# Patient Record
Sex: FEMALE | Race: WHITE | NOT HISPANIC OR LATINO
[De-identification: names, ages, dates, MRNs, and addresses within clinical notes are randomized per-mention and may not be internally consistent; named-entity substitution may affect disease eponyms.]

---

## 2017-01-09 ENCOUNTER — RX RENEWAL (OUTPATIENT)
Age: 72
End: 2017-01-09

## 2017-01-26 ENCOUNTER — APPOINTMENT (OUTPATIENT)
Dept: PHYSICAL MEDICINE AND REHAB | Facility: CLINIC | Age: 72
End: 2017-01-26

## 2017-01-26 VITALS
WEIGHT: 97 LBS | HEART RATE: 86 BPM | BODY MASS INDEX: 20.63 KG/M2 | SYSTOLIC BLOOD PRESSURE: 146 MMHG | TEMPERATURE: 98 F | OXYGEN SATURATION: 98 % | DIASTOLIC BLOOD PRESSURE: 81 MMHG

## 2017-01-26 RX ORDER — ONABOTULINUMTOXINA 100 [USP'U]/1
100 INJECTION, POWDER, LYOPHILIZED, FOR SOLUTION INTRADERMAL; INTRAMUSCULAR
Qty: 6 | Refills: 0 | Status: ACTIVE | COMMUNITY
Start: 2017-01-26 | End: 1900-01-01

## 2017-02-02 ENCOUNTER — OUTPATIENT (OUTPATIENT)
Dept: OUTPATIENT SERVICES | Facility: HOSPITAL | Age: 72
LOS: 1 days | End: 2017-02-02
Payer: MEDICARE

## 2017-02-02 DIAGNOSIS — M25.611 STIFFNESS OF RIGHT SHOULDER, NOT ELSEWHERE CLASSIFIED: ICD-10-CM

## 2017-02-02 DIAGNOSIS — G81.11 SPASTIC HEMIPLEGIA AFFECTING RIGHT DOMINANT SIDE: ICD-10-CM

## 2017-02-02 DIAGNOSIS — R53.1 WEAKNESS: ICD-10-CM

## 2017-02-02 DIAGNOSIS — M25.612 STIFFNESS OF LEFT SHOULDER, NOT ELSEWHERE CLASSIFIED: ICD-10-CM

## 2017-02-02 DIAGNOSIS — M25.512 PAIN IN LEFT SHOULDER: ICD-10-CM

## 2017-02-02 DIAGNOSIS — Z51.89 ENCOUNTER FOR OTHER SPECIFIED AFTERCARE: ICD-10-CM

## 2017-02-02 DIAGNOSIS — I69.351 HEMIPLEGIA AND HEMIPARESIS FOLLOWING CEREBRAL INFARCTION AFFECTING RIGHT DOMINANT SIDE: ICD-10-CM

## 2017-02-02 DIAGNOSIS — M25.511 PAIN IN RIGHT SHOULDER: ICD-10-CM

## 2017-02-02 DIAGNOSIS — I69.890 APRAXIA FOLLOWING OTHER CEREBROVASCULAR DISEASE: ICD-10-CM

## 2017-02-06 ENCOUNTER — RX RENEWAL (OUTPATIENT)
Age: 72
End: 2017-02-06

## 2017-02-06 ENCOUNTER — CHART COPY (OUTPATIENT)
Age: 72
End: 2017-02-06

## 2017-02-06 DIAGNOSIS — G81.11 SPASTIC HEMIPLEGIA AFFECTING RIGHT DOMINANT SIDE: ICD-10-CM

## 2017-02-06 RX ORDER — ONABOTULINUMTOXINA 100 [USP'U]/1
100 INJECTION, POWDER, LYOPHILIZED, FOR SOLUTION INTRADERMAL; INTRAMUSCULAR
Qty: 6 | Refills: 0 | Status: ACTIVE | OUTPATIENT
Start: 2017-02-06

## 2017-02-07 ENCOUNTER — APPOINTMENT (OUTPATIENT)
Dept: PHYSICAL MEDICINE AND REHAB | Facility: CLINIC | Age: 72
End: 2017-02-07

## 2017-02-07 ENCOUNTER — CHART COPY (OUTPATIENT)
Age: 72
End: 2017-02-07

## 2017-02-07 VITALS
OXYGEN SATURATION: 99 % | DIASTOLIC BLOOD PRESSURE: 76 MMHG | HEART RATE: 84 BPM | TEMPERATURE: 97.3 F | SYSTOLIC BLOOD PRESSURE: 119 MMHG

## 2017-02-07 DIAGNOSIS — I69.320 APHASIA FOLLOWING CEREBRAL INFARCTION: ICD-10-CM

## 2017-02-07 DIAGNOSIS — I63.9 CEREBRAL INFARCTION, UNSPECIFIED: ICD-10-CM

## 2017-02-14 ENCOUNTER — APPOINTMENT (OUTPATIENT)
Dept: NEUROLOGY | Facility: CLINIC | Age: 72
End: 2017-02-14

## 2017-02-14 VITALS
DIASTOLIC BLOOD PRESSURE: 60 MMHG | HEIGHT: 57.5 IN | HEART RATE: 72 BPM | SYSTOLIC BLOOD PRESSURE: 100 MMHG | BODY MASS INDEX: 20.64 KG/M2 | WEIGHT: 97 LBS

## 2017-03-16 ENCOUNTER — APPOINTMENT (OUTPATIENT)
Dept: PHYSICAL MEDICINE AND REHAB | Facility: CLINIC | Age: 72
End: 2017-03-16

## 2017-03-16 VITALS
WEIGHT: 99 LBS | DIASTOLIC BLOOD PRESSURE: 84 MMHG | HEIGHT: 57.5 IN | BODY MASS INDEX: 21.07 KG/M2 | SYSTOLIC BLOOD PRESSURE: 152 MMHG | TEMPERATURE: 97.6 F

## 2017-03-16 RX ORDER — ONABOTULINUMTOXINA 100 [USP'U]/1
100 INJECTION, POWDER, LYOPHILIZED, FOR SOLUTION INTRADERMAL; INTRAMUSCULAR
Qty: 6 | Refills: 0 | Status: ACTIVE | OUTPATIENT
Start: 2017-03-16

## 2017-04-07 ENCOUNTER — APPOINTMENT (OUTPATIENT)
Dept: NEUROLOGY | Facility: CLINIC | Age: 72
End: 2017-04-07

## 2017-04-07 VITALS
SYSTOLIC BLOOD PRESSURE: 146 MMHG | HEIGHT: 57.5 IN | DIASTOLIC BLOOD PRESSURE: 82 MMHG | WEIGHT: 99 LBS | BODY MASS INDEX: 21.07 KG/M2 | HEART RATE: 72 BPM

## 2017-04-12 ENCOUNTER — FORM ENCOUNTER (OUTPATIENT)
Age: 72
End: 2017-04-12

## 2017-04-13 ENCOUNTER — APPOINTMENT (OUTPATIENT)
Dept: CT IMAGING | Facility: CLINIC | Age: 72
End: 2017-04-13

## 2017-04-13 ENCOUNTER — LABORATORY RESULT (OUTPATIENT)
Age: 72
End: 2017-04-13

## 2017-04-13 ENCOUNTER — OUTPATIENT (OUTPATIENT)
Dept: OUTPATIENT SERVICES | Facility: HOSPITAL | Age: 72
LOS: 1 days | End: 2017-04-13
Payer: MEDICARE

## 2017-04-13 DIAGNOSIS — Z00.8 ENCOUNTER FOR OTHER GENERAL EXAMINATION: ICD-10-CM

## 2017-04-13 PROCEDURE — 70450 CT HEAD/BRAIN W/O DYE: CPT

## 2017-04-14 LAB
ALBUMIN SERPL ELPH-MCNC: 4.5 G/DL
ALP BLD-CCNC: 84 U/L
ALT SERPL-CCNC: 22 U/L
ANION GAP SERPL CALC-SCNC: 14 MMOL/L
AST SERPL-CCNC: 23 U/L
BASOPHILS # BLD AUTO: 0.02 K/UL
BASOPHILS NFR BLD AUTO: 0.3 %
BILIRUB SERPL-MCNC: 0.5 MG/DL
BUN SERPL-MCNC: 19 MG/DL
CALCIUM SERPL-MCNC: 9.9 MG/DL
CHLORIDE SERPL-SCNC: 99 MMOL/L
CO2 SERPL-SCNC: 28 MMOL/L
CREAT SERPL-MCNC: 0.53 MG/DL
EOSINOPHIL # BLD AUTO: 0.09 K/UL
EOSINOPHIL NFR BLD AUTO: 1.3 %
FOLATE SERPL-MCNC: >20 NG/ML
GLUCOSE SERPL-MCNC: 99 MG/DL
HCT VFR BLD CALC: 40.8 %
HGB BLD-MCNC: 13.8 G/DL
IMM GRANULOCYTES NFR BLD AUTO: 0.1 %
LYMPHOCYTES # BLD AUTO: 1.67 K/UL
LYMPHOCYTES NFR BLD AUTO: 24.5 %
MAN DIFF?: NORMAL
MCHC RBC-ENTMCNC: 32.1 PG
MCHC RBC-ENTMCNC: 33.8 GM/DL
MCV RBC AUTO: 94.9 FL
MONOCYTES # BLD AUTO: 0.33 K/UL
MONOCYTES NFR BLD AUTO: 4.8 %
NEUTROPHILS # BLD AUTO: 4.69 K/UL
NEUTROPHILS NFR BLD AUTO: 69 %
PLATELET # BLD AUTO: 345 K/UL
POTASSIUM SERPL-SCNC: 4.3 MMOL/L
PROT SERPL-MCNC: 7.5 G/DL
RBC # BLD: 4.3 M/UL
RBC # FLD: 12.2 %
SODIUM SERPL-SCNC: 140 MMOL/L
T3 SERPL-MCNC: 100 NG/DL
T3RU NFR SERPL: 1.05 INDEX
T4 SERPL-MCNC: 9 UG/DL
TSH SERPL-ACNC: 1.15 UIU/ML
VIT B12 SERPL-MCNC: 211 PG/ML
WBC # FLD AUTO: 6.81 K/UL

## 2017-05-09 ENCOUNTER — RX RENEWAL (OUTPATIENT)
Age: 72
End: 2017-05-09

## 2017-05-09 RX ORDER — LIDOCAINE HYDROCHLORIDE 30 MG/G
3 CREAM TOPICAL
Qty: 1 | Refills: 2 | Status: ACTIVE | COMMUNITY
Start: 2017-05-09 | End: 1900-01-01

## 2017-05-10 ENCOUNTER — APPOINTMENT (OUTPATIENT)
Dept: PHYSICAL MEDICINE AND REHAB | Facility: CLINIC | Age: 72
End: 2017-05-10

## 2017-05-10 ENCOUNTER — CHART COPY (OUTPATIENT)
Age: 72
End: 2017-05-10

## 2017-05-10 VITALS
TEMPERATURE: 97.7 F | DIASTOLIC BLOOD PRESSURE: 80 MMHG | OXYGEN SATURATION: 96 % | HEART RATE: 80 BPM | SYSTOLIC BLOOD PRESSURE: 125 MMHG

## 2017-05-17 ENCOUNTER — CHART COPY (OUTPATIENT)
Age: 72
End: 2017-05-17

## 2017-05-23 PROCEDURE — G9163: CPT | Mod: CK

## 2017-05-23 PROCEDURE — 92507 TX SP LANG VOICE COMM INDIV: CPT

## 2017-05-23 PROCEDURE — 97110 THERAPEUTIC EXERCISES: CPT

## 2017-05-23 PROCEDURE — 97140 MANUAL THERAPY 1/> REGIONS: CPT

## 2017-05-23 PROCEDURE — 97163 PT EVAL HIGH COMPLEX 45 MIN: CPT

## 2017-05-23 PROCEDURE — 97167 OT EVAL HIGH COMPLEX 60 MIN: CPT

## 2017-05-23 PROCEDURE — 97750 PHYSICAL PERFORMANCE TEST: CPT | Mod: GA

## 2017-05-23 PROCEDURE — G8985: CPT | Mod: CK

## 2017-05-23 PROCEDURE — 97112 NEUROMUSCULAR REEDUCATION: CPT

## 2017-05-23 PROCEDURE — 97535 SELF CARE MNGMENT TRAINING: CPT

## 2017-05-23 PROCEDURE — 97760 ORTHOTIC MGMT&TRAING 1ST ENC: CPT | Mod: GA

## 2017-05-23 PROCEDURE — G9162: CPT | Mod: CK

## 2017-05-23 PROCEDURE — 97116 GAIT TRAINING THERAPY: CPT

## 2017-05-23 PROCEDURE — 92523 SPEECH SOUND LANG COMPREHEN: CPT | Mod: GA

## 2017-05-23 PROCEDURE — G8984: CPT | Mod: CL

## 2017-05-23 PROCEDURE — G8979: CPT | Mod: CK

## 2017-05-23 PROCEDURE — 97530 THERAPEUTIC ACTIVITIES: CPT

## 2017-05-23 PROCEDURE — G8978: CPT | Mod: CL

## 2017-06-03 ENCOUNTER — EMERGENCY (EMERGENCY)
Facility: HOSPITAL | Age: 72
LOS: 0 days | Discharge: HOME | End: 2017-06-03
Attending: EMERGENCY MEDICINE | Admitting: EMERGENCY MEDICINE
Payer: MEDICARE

## 2017-06-03 VITALS
DIASTOLIC BLOOD PRESSURE: 72 MMHG | WEIGHT: 104.94 LBS | RESPIRATION RATE: 16 BRPM | HEIGHT: 62 IN | TEMPERATURE: 98 F | OXYGEN SATURATION: 99 % | SYSTOLIC BLOOD PRESSURE: 108 MMHG | HEART RATE: 86 BPM

## 2017-06-03 VITALS
DIASTOLIC BLOOD PRESSURE: 77 MMHG | OXYGEN SATURATION: 98 % | SYSTOLIC BLOOD PRESSURE: 131 MMHG | HEART RATE: 84 BPM | RESPIRATION RATE: 16 BRPM | TEMPERATURE: 98 F

## 2017-06-03 DIAGNOSIS — Z86.73 PERSONAL HISTORY OF TRANSIENT ISCHEMIC ATTACK (TIA), AND CEREBRAL INFARCTION WITHOUT RESIDUAL DEFICITS: ICD-10-CM

## 2017-06-03 DIAGNOSIS — E78.5 HYPERLIPIDEMIA, UNSPECIFIED: ICD-10-CM

## 2017-06-03 DIAGNOSIS — N39.0 URINARY TRACT INFECTION, SITE NOT SPECIFIED: ICD-10-CM

## 2017-06-03 LAB
ALBUMIN SERPL ELPH-MCNC: 3.8 G/DL — SIGNIFICANT CHANGE UP (ref 3.3–5)
ALP SERPL-CCNC: 100 U/L — SIGNIFICANT CHANGE UP (ref 40–120)
ALT FLD-CCNC: 25 U/L — SIGNIFICANT CHANGE UP (ref 12–78)
ANION GAP SERPL CALC-SCNC: 7 MMOL/L — SIGNIFICANT CHANGE UP (ref 5–17)
APPEARANCE UR: CLEAR — SIGNIFICANT CHANGE UP
AST SERPL-CCNC: 22 U/L — SIGNIFICANT CHANGE UP (ref 15–37)
BACTERIA # UR AUTO: (no result)
BASOPHILS # BLD AUTO: 0.1 K/UL — SIGNIFICANT CHANGE UP (ref 0–0.2)
BASOPHILS NFR BLD AUTO: 1.1 % — SIGNIFICANT CHANGE UP (ref 0–2)
BILIRUB SERPL-MCNC: 0.8 MG/DL — SIGNIFICANT CHANGE UP (ref 0.2–1.2)
BILIRUB UR-MCNC: NEGATIVE — SIGNIFICANT CHANGE UP
BUN SERPL-MCNC: 13 MG/DL — SIGNIFICANT CHANGE UP (ref 7–23)
CALCIUM SERPL-MCNC: 9.5 MG/DL — SIGNIFICANT CHANGE UP (ref 8.5–10.1)
CHLORIDE SERPL-SCNC: 103 MMOL/L — SIGNIFICANT CHANGE UP (ref 96–108)
CK SERPL-CCNC: 66 U/L — SIGNIFICANT CHANGE UP (ref 26–192)
CO2 SERPL-SCNC: 30 MMOL/L — SIGNIFICANT CHANGE UP (ref 22–31)
COLOR SPEC: YELLOW — SIGNIFICANT CHANGE UP
CREAT SERPL-MCNC: 0.57 MG/DL — SIGNIFICANT CHANGE UP (ref 0.5–1.3)
DIFF PNL FLD: (no result)
EOSINOPHIL # BLD AUTO: 0.2 K/UL — SIGNIFICANT CHANGE UP (ref 0–0.5)
EOSINOPHIL NFR BLD AUTO: 2.1 % — SIGNIFICANT CHANGE UP (ref 0–6)
EPI CELLS # UR: SIGNIFICANT CHANGE UP
GLUCOSE SERPL-MCNC: 82 MG/DL — SIGNIFICANT CHANGE UP (ref 70–99)
GLUCOSE UR QL: NEGATIVE MG/DL — SIGNIFICANT CHANGE UP
HCT VFR BLD CALC: 40.2 % — SIGNIFICANT CHANGE UP (ref 34.5–45)
HGB BLD-MCNC: 14 G/DL — SIGNIFICANT CHANGE UP (ref 11.5–15.5)
KETONES UR-MCNC: (no result)
LEUKOCYTE ESTERASE UR-ACNC: (no result)
LYMPHOCYTES # BLD AUTO: 1.8 K/UL — SIGNIFICANT CHANGE UP (ref 1–3.3)
LYMPHOCYTES # BLD AUTO: 19.9 % — SIGNIFICANT CHANGE UP (ref 13–44)
MCHC RBC-ENTMCNC: 32.9 PG — SIGNIFICANT CHANGE UP (ref 27–34)
MCHC RBC-ENTMCNC: 34.8 GM/DL — SIGNIFICANT CHANGE UP (ref 32–36)
MCV RBC AUTO: 94.8 FL — SIGNIFICANT CHANGE UP (ref 80–100)
MONOCYTES # BLD AUTO: 0.6 K/UL — SIGNIFICANT CHANGE UP (ref 0–0.9)
MONOCYTES NFR BLD AUTO: 7 % — SIGNIFICANT CHANGE UP (ref 2–14)
NEUTROPHILS # BLD AUTO: 6.2 K/UL — SIGNIFICANT CHANGE UP (ref 1.8–7.4)
NEUTROPHILS NFR BLD AUTO: 69.8 % — SIGNIFICANT CHANGE UP (ref 43–77)
NITRITE UR-MCNC: NEGATIVE — SIGNIFICANT CHANGE UP
PH UR: 6 — SIGNIFICANT CHANGE UP (ref 5–8)
PLATELET # BLD AUTO: 306 K/UL — SIGNIFICANT CHANGE UP (ref 150–400)
POTASSIUM SERPL-MCNC: 4.1 MMOL/L — SIGNIFICANT CHANGE UP (ref 3.5–5.3)
POTASSIUM SERPL-SCNC: 4.1 MMOL/L — SIGNIFICANT CHANGE UP (ref 3.5–5.3)
PROT SERPL-MCNC: 7 GM/DL — SIGNIFICANT CHANGE UP (ref 6–8.3)
PROT UR-MCNC: NEGATIVE MG/DL — SIGNIFICANT CHANGE UP
RBC # BLD: 4.25 M/UL — SIGNIFICANT CHANGE UP (ref 3.8–5.2)
RBC # FLD: 11 % — SIGNIFICANT CHANGE UP (ref 10.3–14.5)
RBC CASTS # UR COMP ASSIST: (no result) /HPF (ref 0–4)
SODIUM SERPL-SCNC: 140 MMOL/L — SIGNIFICANT CHANGE UP (ref 135–145)
SP GR SPEC: 1.01 — SIGNIFICANT CHANGE UP (ref 1.01–1.02)
TROPONIN I SERPL-MCNC: <0.015 NG/ML — SIGNIFICANT CHANGE UP (ref 0.01–0.04)
UROBILINOGEN FLD QL: NEGATIVE MG/DL — SIGNIFICANT CHANGE UP
WBC # BLD: 8.8 K/UL — SIGNIFICANT CHANGE UP (ref 3.8–10.5)
WBC # FLD AUTO: 8.8 K/UL — SIGNIFICANT CHANGE UP (ref 3.8–10.5)
WBC UR QL: (no result)

## 2017-06-03 PROCEDURE — 93010 ELECTROCARDIOGRAM REPORT: CPT

## 2017-06-03 PROCEDURE — 99284 EMERGENCY DEPT VISIT MOD MDM: CPT

## 2017-06-03 PROCEDURE — 70450 CT HEAD/BRAIN W/O DYE: CPT | Mod: 26

## 2017-06-03 RX ORDER — SODIUM CHLORIDE 9 MG/ML
1000 INJECTION INTRAMUSCULAR; INTRAVENOUS; SUBCUTANEOUS ONCE
Qty: 0 | Refills: 0 | Status: COMPLETED | OUTPATIENT
Start: 2017-06-03 | End: 2017-06-03

## 2017-06-03 RX ORDER — CEFTRIAXONE 500 MG/1
1 INJECTION, POWDER, FOR SOLUTION INTRAMUSCULAR; INTRAVENOUS ONCE
Qty: 0 | Refills: 0 | Status: COMPLETED | OUTPATIENT
Start: 2017-06-03 | End: 2017-06-03

## 2017-06-03 RX ORDER — CEFDINIR 250 MG/5ML
1 POWDER, FOR SUSPENSION ORAL
Qty: 10 | Refills: 0 | OUTPATIENT
Start: 2017-06-03 | End: 2017-06-08

## 2017-06-03 RX ADMIN — CEFTRIAXONE 100 GRAM(S): 500 INJECTION, POWDER, FOR SOLUTION INTRAMUSCULAR; INTRAVENOUS at 17:13

## 2017-06-03 RX ADMIN — SODIUM CHLORIDE 1000 MILLILITER(S): 9 INJECTION INTRAMUSCULAR; INTRAVENOUS; SUBCUTANEOUS at 17:13

## 2017-06-03 NOTE — ED PROVIDER NOTE - OBJECTIVE STATEMENT
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 Denies any pain, HA, abd pain, dysuria. Pt started having seizures last year, was hospitalized for 14 days. As per , when he helped her go to the bathroom, he noticed Pt was dehydration, slurred speech, difficulty walking and left sided weakness. Pt ambulates with cane or walker. Dr. Rao, PMPETE. 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 Denies any pain, HA, abd pain, dysuria. Pt started having seizures last year, was hospitalized for 14 days. As per , when he helped her go to the bathroom, he noticed Pt was dehydration, slurred speech, difficulty walking and left sided weakness. Pt ambulates with cane or walker. Dr. Rao, PMD. Dr. Zheng, neuro. 71 y/o female with PMHx of hemorrhagic CVA with right sided hemiplegia two years ago and seizures presents to the ED c/o subtle weakness x 2 weeks. Denies any pain, HA, abd pain, dysuria. Pt started having seizures last year, was hospitalized for 14 days. As per , when he helped her go to the bathroom, he noticed Pt was dehydration, slurred speech, difficulty walking and left sided weakness. Pt ambulates with cane or walker. Dr. Garza, PMD. Dr. Zheng, neuro.

## 2017-06-03 NOTE — ED ADULT NURSE REASSESSMENT NOTE - NS ED NURSE REASSESS COMMENT FT1
Report taken at the change of shift from Rochelle Carpenter at bedside. Pt awake alert and oriented x4 resting comfortably in bed with no acute distress noted. Family and aid at bedside. Vitals stable. NS infusing at this time. Denies cp,sob,ha,dz,n/v/d/fever/chill or urinary sx. Will cont to monitor.

## 2017-06-03 NOTE — ED PROVIDER NOTE - DETAILS:
I, Sowmya Floyd, performed the initial face to face bedside interview with this patient regarding history of present illness, review of symptoms and relevant past medical, social and family history.  I completed an independent physical examination.  I was the initial provider who evaluated this patient. The history, relevant review of systems, past medical and surgical history, medical decision making, and physical examination was documented by the scribe in my presence and I attest to the accuracy of the documentation.

## 2017-06-03 NOTE — ED PROVIDER NOTE - PMH
Basal ganglia hemorrhage    CVA (cerebral infarction)    High cholesterol    Hyperlipemia    Seizure

## 2017-06-03 NOTE — ED ADULT NURSE REASSESSMENT NOTE - COMFORT CARE
3pm rounding completed, vitals done as well, no other assistance needed
meal provided/patient fed dinner, no other assistance needed
7pm rounding complete, vitals done no other assistance needed
wait time explained

## 2017-06-03 NOTE — ED PROVIDER NOTE - NS ED MD SCRIBE ATTENDING SCRIBE SECTIONS
PROGRESS NOTE/PAST MEDICAL/SURGICAL/SOCIAL HISTORY/VITAL SIGNS( Pullset)/RESULTS/HISTORY OF PRESENT ILLNESS/REVIEW OF SYSTEMS/PHYSICAL EXAM/DISPOSITION

## 2017-06-03 NOTE — ED ADULT NURSE NOTE - CHPI ED SYMPTOMS NEG
no fever/no abdominal distension/no dysuria/no vomiting/no diarrhea/no blood in stool/no hematuria/no chills/no nausea/no burning urination

## 2017-06-03 NOTE — ED ADULT NURSE NOTE - OBJECTIVE STATEMENT
Pt is a 72y female, A & O x 4, VSS, presents to ED w/ urinary hesitency, dehydration, pt sent in by MD Garza for eval., pt hx of CVA in 2014, right sided upper extremity, lower extremity weakness, expressive aphasia, pt is non-ambulatory, pt denies chest pain, SOB, fever, chills, nausea, vomiting or diarrhea, pt aide and family at bedside, pt in no apparent distress, will continue to monitor.

## 2017-06-06 ENCOUNTER — INPATIENT (INPATIENT)
Facility: HOSPITAL | Age: 72
LOS: 2 days | Discharge: NURSING, REHAB OR LONG-TERM CARE | End: 2017-06-09
Attending: INTERNAL MEDICINE | Admitting: HOSPITALIST
Payer: MEDICARE

## 2017-06-06 VITALS
OXYGEN SATURATION: 98 % | TEMPERATURE: 98 F | RESPIRATION RATE: 17 BRPM | HEART RATE: 83 BPM | DIASTOLIC BLOOD PRESSURE: 71 MMHG | SYSTOLIC BLOOD PRESSURE: 135 MMHG | HEIGHT: 58 IN | WEIGHT: 98.11 LBS

## 2017-06-06 LAB
ALBUMIN SERPL ELPH-MCNC: 3.6 G/DL — SIGNIFICANT CHANGE UP (ref 3.3–5)
ALP SERPL-CCNC: 91 U/L — SIGNIFICANT CHANGE UP (ref 40–120)
ALT FLD-CCNC: 25 U/L — SIGNIFICANT CHANGE UP (ref 12–78)
ANION GAP SERPL CALC-SCNC: 6 MMOL/L — SIGNIFICANT CHANGE UP (ref 5–17)
APPEARANCE UR: CLEAR — SIGNIFICANT CHANGE UP
APTT BLD: 28.6 SEC — SIGNIFICANT CHANGE UP (ref 27.5–37.4)
AST SERPL-CCNC: 17 U/L — SIGNIFICANT CHANGE UP (ref 15–37)
BACTERIA # UR AUTO: (no result)
BASOPHILS # BLD AUTO: 0.1 K/UL — SIGNIFICANT CHANGE UP (ref 0–0.2)
BASOPHILS NFR BLD AUTO: 1.4 % — SIGNIFICANT CHANGE UP (ref 0–2)
BILIRUB SERPL-MCNC: 0.5 MG/DL — SIGNIFICANT CHANGE UP (ref 0.2–1.2)
BILIRUB UR-MCNC: NEGATIVE — SIGNIFICANT CHANGE UP
BUN SERPL-MCNC: 13 MG/DL — SIGNIFICANT CHANGE UP (ref 7–23)
CALCIUM SERPL-MCNC: 9.1 MG/DL — SIGNIFICANT CHANGE UP (ref 8.5–10.1)
CHLORIDE SERPL-SCNC: 105 MMOL/L — SIGNIFICANT CHANGE UP (ref 96–108)
CO2 SERPL-SCNC: 32 MMOL/L — HIGH (ref 22–31)
COLOR SPEC: YELLOW — SIGNIFICANT CHANGE UP
CREAT SERPL-MCNC: 0.56 MG/DL — SIGNIFICANT CHANGE UP (ref 0.5–1.3)
DIFF PNL FLD: NEGATIVE — SIGNIFICANT CHANGE UP
EOSINOPHIL # BLD AUTO: 0.1 K/UL — SIGNIFICANT CHANGE UP (ref 0–0.5)
EOSINOPHIL NFR BLD AUTO: 1.6 % — SIGNIFICANT CHANGE UP (ref 0–6)
GLUCOSE SERPL-MCNC: 137 MG/DL — HIGH (ref 70–99)
GLUCOSE UR QL: NEGATIVE MG/DL — SIGNIFICANT CHANGE UP
HCT VFR BLD CALC: 38.8 % — SIGNIFICANT CHANGE UP (ref 34.5–45)
HGB BLD-MCNC: 13.8 G/DL — SIGNIFICANT CHANGE UP (ref 11.5–15.5)
INR BLD: 1.15 RATIO — SIGNIFICANT CHANGE UP (ref 0.88–1.16)
KETONES UR-MCNC: (no result)
LEUKOCYTE ESTERASE UR-ACNC: (no result)
LYMPHOCYTES # BLD AUTO: 1.8 K/UL — SIGNIFICANT CHANGE UP (ref 1–3.3)
LYMPHOCYTES # BLD AUTO: 21.5 % — SIGNIFICANT CHANGE UP (ref 13–44)
MANUAL DIF COMMENT BLD-IMP: SIGNIFICANT CHANGE UP
MCHC RBC-ENTMCNC: 32.7 PG — SIGNIFICANT CHANGE UP (ref 27–34)
MCHC RBC-ENTMCNC: 35.5 GM/DL — SIGNIFICANT CHANGE UP (ref 32–36)
MCV RBC AUTO: 92.1 FL — SIGNIFICANT CHANGE UP (ref 80–100)
MONOCYTES # BLD AUTO: 0.4 K/UL — SIGNIFICANT CHANGE UP (ref 0–0.9)
MONOCYTES NFR BLD AUTO: 4.3 % — SIGNIFICANT CHANGE UP (ref 2–14)
NEUTROPHILS # BLD AUTO: 5.9 K/UL — SIGNIFICANT CHANGE UP (ref 1.8–7.4)
NEUTROPHILS NFR BLD AUTO: 71.1 % — SIGNIFICANT CHANGE UP (ref 43–77)
NITRITE UR-MCNC: NEGATIVE — SIGNIFICANT CHANGE UP
PH UR: 5 — SIGNIFICANT CHANGE UP (ref 5–8)
PLAT MORPH BLD: NORMAL — SIGNIFICANT CHANGE UP
PLATELET # BLD AUTO: 295 K/UL — SIGNIFICANT CHANGE UP (ref 150–400)
POTASSIUM SERPL-MCNC: 3.6 MMOL/L — SIGNIFICANT CHANGE UP (ref 3.5–5.3)
POTASSIUM SERPL-SCNC: 3.6 MMOL/L — SIGNIFICANT CHANGE UP (ref 3.5–5.3)
PROT SERPL-MCNC: 6.6 GM/DL — SIGNIFICANT CHANGE UP (ref 6–8.3)
PROT UR-MCNC: NEGATIVE MG/DL — SIGNIFICANT CHANGE UP
PROTHROM AB SERPL-ACNC: 12.5 SEC — SIGNIFICANT CHANGE UP (ref 9.8–12.7)
RBC # BLD: 4.21 M/UL — SIGNIFICANT CHANGE UP (ref 3.8–5.2)
RBC # FLD: 10.8 % — SIGNIFICANT CHANGE UP (ref 10.3–14.5)
RBC BLD AUTO: NORMAL — SIGNIFICANT CHANGE UP
RBC CASTS # UR COMP ASSIST: SIGNIFICANT CHANGE UP /HPF (ref 0–4)
SODIUM SERPL-SCNC: 143 MMOL/L — SIGNIFICANT CHANGE UP (ref 135–145)
SP GR SPEC: 1.02 — SIGNIFICANT CHANGE UP (ref 1.01–1.02)
UROBILINOGEN FLD QL: NEGATIVE MG/DL — SIGNIFICANT CHANGE UP
WBC # BLD: 8.4 K/UL — SIGNIFICANT CHANGE UP (ref 3.8–10.5)
WBC # FLD AUTO: 8.4 K/UL — SIGNIFICANT CHANGE UP (ref 3.8–10.5)
WBC UR QL: SIGNIFICANT CHANGE UP

## 2017-06-06 PROCEDURE — 93010 ELECTROCARDIOGRAM REPORT: CPT

## 2017-06-06 PROCEDURE — 99285 EMERGENCY DEPT VISIT HI MDM: CPT

## 2017-06-06 PROCEDURE — 71010: CPT | Mod: 26

## 2017-06-06 RX ORDER — DULOXETINE HYDROCHLORIDE 30 MG/1
60 CAPSULE, DELAYED RELEASE ORAL DAILY
Qty: 0 | Refills: 0 | Status: DISCONTINUED | OUTPATIENT
Start: 2017-06-06 | End: 2017-06-09

## 2017-06-06 RX ORDER — DULOXETINE HYDROCHLORIDE 30 MG/1
30 CAPSULE, DELAYED RELEASE ORAL DAILY
Qty: 0 | Refills: 0 | Status: DISCONTINUED | OUTPATIENT
Start: 2017-06-06 | End: 2017-06-09

## 2017-06-06 RX ORDER — PREGABALIN 225 MG/1
1000 CAPSULE ORAL DAILY
Qty: 0 | Refills: 0 | Status: DISCONTINUED | OUTPATIENT
Start: 2017-06-06 | End: 2017-06-09

## 2017-06-06 RX ORDER — LISINOPRIL 2.5 MG/1
10 TABLET ORAL DAILY
Qty: 0 | Refills: 0 | Status: DISCONTINUED | OUTPATIENT
Start: 2017-06-06 | End: 2017-06-09

## 2017-06-06 RX ORDER — OXYBUTYNIN CHLORIDE 5 MG
5 TABLET ORAL
Qty: 0 | Refills: 0 | Status: DISCONTINUED | OUTPATIENT
Start: 2017-06-06 | End: 2017-06-09

## 2017-06-06 RX ORDER — SODIUM CHLORIDE 9 MG/ML
1000 INJECTION INTRAMUSCULAR; INTRAVENOUS; SUBCUTANEOUS ONCE
Qty: 0 | Refills: 0 | Status: COMPLETED | OUTPATIENT
Start: 2017-06-06 | End: 2017-06-06

## 2017-06-06 RX ORDER — ARIPIPRAZOLE 15 MG/1
2 TABLET ORAL DAILY
Qty: 0 | Refills: 0 | Status: DISCONTINUED | OUTPATIENT
Start: 2017-06-06 | End: 2017-06-09

## 2017-06-06 RX ORDER — HEPARIN SODIUM 5000 [USP'U]/ML
5000 INJECTION INTRAVENOUS; SUBCUTANEOUS EVERY 8 HOURS
Qty: 0 | Refills: 0 | Status: DISCONTINUED | OUTPATIENT
Start: 2017-06-06 | End: 2017-06-09

## 2017-06-06 RX ORDER — LEVETIRACETAM 250 MG/1
500 TABLET, FILM COATED ORAL
Qty: 0 | Refills: 0 | Status: DISCONTINUED | OUTPATIENT
Start: 2017-06-06 | End: 2017-06-09

## 2017-06-06 RX ORDER — ATORVASTATIN CALCIUM 80 MG/1
20 TABLET, FILM COATED ORAL AT BEDTIME
Qty: 0 | Refills: 0 | Status: DISCONTINUED | OUTPATIENT
Start: 2017-06-06 | End: 2017-06-09

## 2017-06-06 RX ORDER — LAMOTRIGINE 25 MG/1
100 TABLET, ORALLY DISINTEGRATING ORAL AT BEDTIME
Qty: 0 | Refills: 0 | Status: DISCONTINUED | OUTPATIENT
Start: 2017-06-06 | End: 2017-06-09

## 2017-06-06 RX ADMIN — LEVETIRACETAM 500 MILLIGRAM(S): 250 TABLET, FILM COATED ORAL at 22:26

## 2017-06-06 RX ADMIN — SODIUM CHLORIDE 1000 MILLILITER(S): 9 INJECTION INTRAMUSCULAR; INTRAVENOUS; SUBCUTANEOUS at 18:54

## 2017-06-06 RX ADMIN — DULOXETINE HYDROCHLORIDE 60 MILLIGRAM(S): 30 CAPSULE, DELAYED RELEASE ORAL at 22:26

## 2017-06-06 RX ADMIN — ATORVASTATIN CALCIUM 20 MILLIGRAM(S): 80 TABLET, FILM COATED ORAL at 22:52

## 2017-06-06 RX ADMIN — LAMOTRIGINE 100 MILLIGRAM(S): 25 TABLET, ORALLY DISINTEGRATING ORAL at 22:26

## 2017-06-06 NOTE — ED ADULT NURSE NOTE - OBJECTIVE STATEMENT
Pt family states pt was here a few days ago for UTI, sent home on ABT but brought back today for weakness. Pt alert and forgetful. Color good. Inc of urine x1. Afebrile

## 2017-06-06 NOTE — H&P ADULT - NSHPLABSRESULTS_GEN_ALL_CORE
LABS: All Labs Reviewed:                        13.8   8.4   )-----------( 295      ( 06 Jun 2017 18:16 )             38.8     06-06    143  |  105  |  13  ----------------------------<  137<H>  3.6   |  32<H>  |  0.56    Ca    9.1      06 Jun 2017 18:16    TPro  6.6  /  Alb  3.6  /  TBili  0.5  /  DBili  x   /  AST  17  /  ALT  25  /  AlkPhos  91  06-06    PT/INR - ( 06 Jun 2017 18:16 )   PT: 12.5 sec;   INR: 1.15 ratio         PTT - ( 06 Jun 2017 18:16 )  PTT:28.6 sec          Blood Culture:         [facility] 6/3:    IMPRESSION:     No acute intracranial hemorrhage, mass effect, or midline shift.

## 2017-06-06 NOTE — ED PROVIDER NOTE - OBJECTIVE STATEMENT
73 y/o female with PMHx of TIA 2 years ago with residual right-sided weakness and recent UTI presents to the ED c/o abnormal gait, weakness, dehydration. As per family at bedside, Pt can not stand up or sit up in bed without assistance. Pt uses a walker at home. Pt does not believe this is another stroke. Pt was here 3 days ago for weakness. Pt has speech impediment. Dr. Garza, PMD.

## 2017-06-06 NOTE — ED STATDOCS - PROGRESS NOTE DETAILS
71 y/o female with PMHx of CVA (9/2014), HTN, HLD, seizure disorder (on Keppra), basal ganglia hemorrhage who was tx at  ED for a UTI and dehydration 3 days ago presents to the ED from PMD office c/o weakness x 3 days. Pt is unable to stand, feels weak in the legs starting Saturday night. Pt is recovering from a CVA that occurred 2 years ago and normally ambulates with walker/cane. Denies N/V/D. Currently on Ceftin and has been taking them. +reduced PO intake. Pt fell this morning. Aide was lifting her to use the commode and pts legs gave out. Compliant with Keppra, no recent seizures. PMD Dr. Michelle Heath Will send pt to the main ED for further evaluation.

## 2017-06-06 NOTE — ED STATDOCS - NS ED MD SCRIBE ATTENDING SCRIBE SECTIONS
DISPOSITION/RESULTS/VITAL SIGNS( Pullset)/PROGRESS NOTE/PAST MEDICAL/SURGICAL/SOCIAL HISTORY/PHYSICAL EXAM/HISTORY OF PRESENT ILLNESS/REVIEW OF SYSTEMS

## 2017-06-06 NOTE — ED PROVIDER NOTE - NS ED MD SCRIBE ATTENDING SCRIBE SECTIONS
PHYSICAL EXAM/RESULTS/PAST MEDICAL/SURGICAL/SOCIAL HISTORY/PROGRESS NOTE/DISPOSITION/HISTORY OF PRESENT ILLNESS/VITAL SIGNS( Pullset)/REVIEW OF SYSTEMS

## 2017-06-06 NOTE — H&P ADULT - NSHPPHYSICALEXAM_GEN_ALL_CORE
PHYSICAL EXAM:    Constitutional: NAD, awake and alert, well-developed  HEENT: PERR, EOMI, Normal Hearing, MMM  Neck: Soft and supple, No LAD, No JVD  Respiratory: Breath sounds are clear bilaterally, No wheezing, rales or rhonchi  Cardiovascular: S1 and S2, regular rate and rhythm, no Murmurs, gallops or rubs  Gastrointestinal: Bowel Sounds present, soft, nontender, nondistended, no guarding, no rebound  Extremities: No peripheral edema  Vascular: 2+ peripheral pulses  Neurological: A/O x 3, no focal deficits  Musculoskeletal: 5/5 strength b/l upper and lower extremities  Skin: No rashes PHYSICAL EXAM:    Constitutional: NAD, awake and alert, chronic dysarthria  HEENT: PERR, EOMI, Normal Hearing, MMM  Respiratory: Breath sounds are clear bilaterally, No wheezing, rales or rhonchi  Cardiovascular: S1S2. RRR  Gastrointestinal: nl bowel sounds, no R/G  Extremities: No peripheral edema  Neurological: A/O x 3,   Musculoskeletal: 2/5 strength in RUE 4/5 in both lower. 4/5 in LUE. CN: 2-12 intact.   Skin: No rashes PHYSICAL EXAM:    Constitutional: NAD, awake and alert, chronic dysarthria  HEENT: PERR, EOMI, Normal Hearing, MMM  Respiratory: Breath sounds are clear bilaterally, No wheezing, rales or rhonchi  Cardiovascular: S1S2. RRR  Gastrointestinal: nl bowel sounds, no R/G  Extremities: No peripheral edema  Neurological: A/O x 3,   Musculoskeletal: 2/5 strength in RUE which withdraws to pain; 4/5 motor strength in both lower extremities. 4/5 in LUE. CN: 2-12 intact.   Skin: No rashes

## 2017-06-06 NOTE — ED STATDOCS - DETAILS:
I, Kevin Greer DO, performed the initial face to face bedside interview with this patient regarding history of present illness and determined that the patient should be seen in the main ED.  The history, was documented by the scribe in my presence and I attest to the accuracy of the documentation.

## 2017-06-06 NOTE — ED ADULT NURSE REASSESSMENT NOTE - NS ED NURSE REASSESS COMMENT FT1
patient updated on plan of care. VSS. denies any pain. patient admitted to [facility]. currently waiting for transport to unit .

## 2017-06-06 NOTE — ED ADULT NURSE NOTE - CHPI ED SYMPTOMS NEG
no decreased eating/drinking/no nausea/no fever/no numbness/no tingling/no vomiting/no chills/no dizziness

## 2017-06-06 NOTE — H&P ADULT - HISTORY OF PRESENT ILLNESS
73 y/o female with PMHx of TIA 2 years ago with residual right-sided weakness and recent UTI presents to the ED c/o abnormal gait, weakness, dehydration. As per family at bedside, Pt can not stand up or sit up in bed without assistance. Pt uses a walker at home. Pt does not believe this is another stroke. Pt was here 3 days ago for weakness. Pt has speech impediment. Dr. Garza, PMD. 71 y/o female with PMHx of CVA 2 years ago with residual right-sided weakness, HTN, dyslipidemia, seizure d/o and recent UTI presents to the ED c/o abnormal gait, weakness, dehydration. As per family at bedside, Pt can not stand up or sit up in bed without assistance. Pt uses a walker at home. Pt was here 3 days ago for weakness. Was d/c from ED for uti.  Pt has speech impediment. Dr. Garza, PMD. 71 y/o female with PMHx of CVA 2 years ago with residual right-sided weakness, basal ganglia hemorrhage, HTN, dyslipidemia, seizure d/o and recent UTI presents to the ED c/o abnormal gait, weakness, dehydration. As per her partner Jasen, patient has had a significant functional decline over several weeks  Pt can not stand up or sit up in bed without assistance. Pt uses a walker at home. Pt was here 3 days ago for weakness. Was d/c from ED for uti.  Pt has known speech impediment. Dr. Garza - PCP    Denies N/V/D. No dysuria. No urinary frequency.       Social:former smoker, no etoh  Shx: none  Fhx: NC 73 y/o female with PMHx of CVA 2 years ago with residual right-sided weakness, basal ganglia hemorrhage in 2014 secondarily to uncontrolled HTN, dyslipidemia, seizure d/o and recent UTI presents to the ED c/o abnormal gait, weakness, dehydration. As per her partner Jasen, patient has had a significant functional decline over several weeks  Pt can not stand up or sit up in bed without assistance. Pt uses a walker at home. Pt was here 3 days ago for weakness. Was d/c from ED for uti.  Pt has known speech impediment. Dr. Garza - PCP    Denies N/V/D. No dysuria. No urinary frequency.       Social:former smoker, no etoh  Shx: none  Fhx: NC

## 2017-06-06 NOTE — H&P ADULT - NSHPREVIEWOFSYSTEMS_GEN_ALL_CORE
REVIEW OF SYSTEMS:    CONSTITUTIONAL: No weakness, fevers or chills  EYES/ENT: No visual changes;  No vertigo or throat pain   NECK: No pain or stiffness  RESPIRATORY: No cough, wheezing, hemoptysis; No shortness of breath  CARDIOVASCULAR: No chest pain or palpitations  GASTROINTESTINAL: No abdominal or epigastric pain. No nausea, vomiting, or hematemesis; No diarrhea or constipation. No melena or hematochezia.  GENITOURINARY: No dysuria, frequency or hematuria  NEUROLOGICAL: No numbness or weakness  SKIN: No itching, burning, rashes, or lesions   All other review of systems is negative unless indicated above REVIEW OF SYSTEMS:    CONSTITUTIONAL: functional weakness over several weakness  EYES/ENT: No visual changes;  No vertigo or throat pain   NECK: No pain or stiffness  RESPIRATORY: No cough, wheezing, hemoptysis; No shortness of breath  CARDIOVASCULAR: No chest pain or palpitations  GASTROINTESTINAL: No abdominal or epigastric pain. No nausea, vomiting, or hematemesis; No diarrhea or constipation. No melena or hematochezia.  GENITOURINARY: No dysuria, frequency or hematuria  NEUROLOGICAL: No numbness or weakness  SKIN: No itching, burning, rashes, or lesions   All other review of systems is negative unless indicated above

## 2017-06-06 NOTE — H&P ADULT - ASSESSMENT
73 y/o female with PMHx of CVA 2 years ago with residual right-sided weakness, HTN, dyslipidemia, seizure d/o and recent UTI presents to the ED c/o abnormal gait, weakness, dehydration. As per family at bedside, Pt can not stand up or sit up in bed without assistance. Pt uses a walker at home. Pt was here 3 days ago for weakness. Was d/c from ED for uti.  Pt has speech impediment. Dr. Garza, PMD. 71 y/o female with PMHx of CVA 2 years ago with residual right-sided weakness, HTN, dyslipidemia, seizure d/o and recent UTI presents to the ED c/o abnormal gait, weakness, dehydration. As per family at bedside, Pt can not stand up or sit up in bed without assistance. Pt uses a walker at home. Pt was here 3 days ago for weakness. Was d/c from ED for uti.  Pt has speech impediment. Dr. Garza      #weakness and functional decline secondary to dehydration  Admit for IVF  R/o infection etiology   hx of CVA with functional decline  PT consult      #HTN  Cont lisinopril    #Hyperlipidemia  Statin      Dvt px

## 2017-06-07 RX ADMIN — HEPARIN SODIUM 5000 UNIT(S): 5000 INJECTION INTRAVENOUS; SUBCUTANEOUS at 13:26

## 2017-06-07 RX ADMIN — ARIPIPRAZOLE 2 MILLIGRAM(S): 15 TABLET ORAL at 12:23

## 2017-06-07 RX ADMIN — LEVETIRACETAM 500 MILLIGRAM(S): 250 TABLET, FILM COATED ORAL at 06:17

## 2017-06-07 RX ADMIN — LISINOPRIL 10 MILLIGRAM(S): 2.5 TABLET ORAL at 06:17

## 2017-06-07 RX ADMIN — DULOXETINE HYDROCHLORIDE 60 MILLIGRAM(S): 30 CAPSULE, DELAYED RELEASE ORAL at 12:22

## 2017-06-07 RX ADMIN — ATORVASTATIN CALCIUM 20 MILLIGRAM(S): 80 TABLET, FILM COATED ORAL at 23:02

## 2017-06-07 RX ADMIN — DULOXETINE HYDROCHLORIDE 30 MILLIGRAM(S): 30 CAPSULE, DELAYED RELEASE ORAL at 13:25

## 2017-06-07 RX ADMIN — HEPARIN SODIUM 5000 UNIT(S): 5000 INJECTION INTRAVENOUS; SUBCUTANEOUS at 06:17

## 2017-06-07 RX ADMIN — HEPARIN SODIUM 5000 UNIT(S): 5000 INJECTION INTRAVENOUS; SUBCUTANEOUS at 23:02

## 2017-06-07 RX ADMIN — LEVETIRACETAM 500 MILLIGRAM(S): 250 TABLET, FILM COATED ORAL at 17:33

## 2017-06-07 RX ADMIN — LAMOTRIGINE 100 MILLIGRAM(S): 25 TABLET, ORALLY DISINTEGRATING ORAL at 23:02

## 2017-06-07 RX ADMIN — Medication 5 MILLIGRAM(S): at 06:17

## 2017-06-07 RX ADMIN — PREGABALIN 1000 MICROGRAM(S): 225 CAPSULE ORAL at 12:25

## 2017-06-07 NOTE — PROGRESS NOTE ADULT - SUBJECTIVE AND OBJECTIVE BOX
cc- weakness    HPI:  71 y/o female with PMHx of CVA 2 years ago with residual right-sided weakness, basal ganglia hemorrhage in 2014 secondarily to uncontrolled HTN, dyslipidemia, seizure d/o and recent UTI presents to the ED c/o abnormal gait, weakness, dehydration. As per her partner Jasen, patient has had a significant functional decline over several weeks  Pt can not stand up or sit up in bed without assistance. Pt uses a walker at home. Pt was here 3 days ago for weakness. Was d/c from ED for uti.  Pt has known speech impediment. Dr. Garza - PCP    17- no new complaints. Participated with PT   Social:former smoker, no etoh  Shx: none  Fhx: NC (2017 20:36)    Review of system- All 10 systems reviewed and is as per HPI otherwise negative.     T(C): 36.8, Max: 37 ( @ 22:27)  HR: 88 (80 - 88)  BP: 142/75 (135/71 - 148/66)  RR: 16 (14 - 17)  SpO2: 98% (95% - 100%)  Wt(kg): --    LABS:                        13.8   8.4   )-----------( 295      ( 2017 18:16 )             38.8     -    143  |  105  |  13  ----------------------------<  137<H>  3.6   |  32<H>  |  0.56    Ca    9.1      2017 18:16    TPro  6.6  /  Alb  3.6  /  TBili  0.5  /  DBili  x   /  AST  17  /  ALT  25  /  AlkPhos  91  -  PT/INR - ( 2017 18:16 )   PT: 12.5 sec;   INR: 1.15 ratio         PTT - ( 2017 18:16 )  PTT:28.6 sec  Urinalysis Basic - ( 2017 18:16 )    Color: Yellow / Appearance: Clear / S.020 / pH: x  Gluc: x / Ketone: Trace  / Bili: Negative / Urobili: Negative mg/dL   Blood: x / Protein: Negative mg/dL / Nitrite: Negative   Leuk Esterase: Trace / RBC: 0-2 /HPF / WBC 3-5   Sq Epi: x / Non Sq Epi: x / Bacteria: Occasional    RADIOLOGY & ADDITIONAL TESTS:    PHYSICAL EXAM:  GENERAL: NAD, well-groomed, well-developed  HEAD:  Atraumatic, Normocephalic  EYES: EOMI, PERRLA, conjunctiva and sclera clear  HEENT: Moist mucous membranes  NECK: Supple, No JVD  NERVOUS SYSTEM:  Alert & Oriented X3, RT-sided paresis  CHEST/LUNG: Clear to auscultation bilaterally; No rales, rhonchi, wheezing, or rubs  HEART: Regular rate and rhythm; No murmurs, rubs, or gallops  ABDOMEN: Soft, Nontender, Nondistended; Bowel sounds present  GENITOURINARY- Voiding, no palpable bladder  EXTREMITIES:  2+ Peripheral Pulses, No clubbing, cyanosis, or edema  MUSCULOSKELTAL- No muscle tenderness, Muscle tone normal, No joint tenderness, no Joint swelling, Joint range of motion-normal  SKIN-no rash, no lesion       Daily Height in cm: 147.32 (2017 23:40)        DULoxetine 60milliGRAM(s) Oral daily  levETIRAcetam 500milliGRAM(s) Oral two times a day  lamoTRIgine 100milliGRAM(s) Oral at bedtime  lisinopril 10milliGRAM(s) Oral daily  atorvastatin 20milliGRAM(s) Oral at bedtime  DULoxetine 30milliGRAM(s) Oral daily  cyanocobalamin 1000MICROGram(s) Oral daily  oxybutynin 5milliGRAM(s) Oral every 48 hours  ARIPiprazole 2milliGRAM(s) Oral daily  heparin  Injectable 5000Unit(s) SubCutaneous every 8 hours    Assessment/Plan  #Weakness likely due to functional debility/decline/gait imbalance  Assessed by PT- needs KATYA    #H/o CVA with residual RT-sided paresis  Chronic    #No evidence of dehydration or UTI at present    #HTN/hyperlipidemia- stable    #Dispo- likely KATYA on Friday

## 2017-06-07 NOTE — PHYSICAL THERAPY INITIAL EVALUATION ADULT - PERTINENT HX OF CURRENT PROBLEM, REHAB EVAL
c/o abnormal gait, weakness, dehydration. As per her partner Jasen, patient has had a significant functional decline over several weeks  Pt can not stand up or sit up in bed without assistance. Pt uses a walker at home. Pt was here 3 days ago for weakness. Was d/c from ED for uti

## 2017-06-07 NOTE — PHYSICAL THERAPY INITIAL EVALUATION ADULT - ADDITIONAL COMMENTS
Pt states was able to get in and out of bed, get dressed; shower on her own. was using a walker . has a cane and w/c available..  bedroom and bathroom are on first level of house.  Unable to get clear indication of whether there are ROSHAN.  has expressive aphasia.

## 2017-06-08 RX ADMIN — HEPARIN SODIUM 5000 UNIT(S): 5000 INJECTION INTRAVENOUS; SUBCUTANEOUS at 22:15

## 2017-06-08 RX ADMIN — LEVETIRACETAM 500 MILLIGRAM(S): 250 TABLET, FILM COATED ORAL at 05:29

## 2017-06-08 RX ADMIN — LAMOTRIGINE 100 MILLIGRAM(S): 25 TABLET, ORALLY DISINTEGRATING ORAL at 22:15

## 2017-06-08 RX ADMIN — DULOXETINE HYDROCHLORIDE 60 MILLIGRAM(S): 30 CAPSULE, DELAYED RELEASE ORAL at 12:39

## 2017-06-08 RX ADMIN — LEVETIRACETAM 500 MILLIGRAM(S): 250 TABLET, FILM COATED ORAL at 17:49

## 2017-06-08 RX ADMIN — HEPARIN SODIUM 5000 UNIT(S): 5000 INJECTION INTRAVENOUS; SUBCUTANEOUS at 05:28

## 2017-06-08 RX ADMIN — DULOXETINE HYDROCHLORIDE 30 MILLIGRAM(S): 30 CAPSULE, DELAYED RELEASE ORAL at 12:39

## 2017-06-08 RX ADMIN — ATORVASTATIN CALCIUM 20 MILLIGRAM(S): 80 TABLET, FILM COATED ORAL at 22:15

## 2017-06-08 RX ADMIN — HEPARIN SODIUM 5000 UNIT(S): 5000 INJECTION INTRAVENOUS; SUBCUTANEOUS at 15:39

## 2017-06-08 RX ADMIN — ARIPIPRAZOLE 2 MILLIGRAM(S): 15 TABLET ORAL at 12:41

## 2017-06-08 RX ADMIN — LISINOPRIL 10 MILLIGRAM(S): 2.5 TABLET ORAL at 05:29

## 2017-06-08 NOTE — PROGRESS NOTE ADULT - SUBJECTIVE AND OBJECTIVE BOX
cc- weakness    HPI:  71 y/o female with PMHx of CVA 2 years ago with residual right-sided weakness, basal ganglia hemorrhage in 2014 secondarily to uncontrolled HTN, dyslipidemia, seizure d/o and recent UTI presents to the ED c/o abnormal gait, weakness, dehydration. As per her partner Jasen, patient has had a significant functional decline over several weeks  Pt can not stand up or sit up in bed without assistance. Pt uses a walker at home. Pt was here 3 days ago for weakness. Was d/c from ED for uti.  Pt has known speech impediment. Dr. Garza - PCP    17- no new complaints. Participated with PT   17- no new events    Social:former smoker, no etoh  Shx: none  Fhx: NC (2017 20:36)    Review of system- All 10 systems reviewed and is as per HPI otherwise negative.     Vital Signs Last 24 Hrs  T(C): 36.9, Max: 36.9 ( @ 23:40)  T(F): 98.5, Max: 98.5 ( @ 23:40)  HR: 88 (83 - 88)  BP: 153/85 (153/85 - 159/88)  BP(mean): --  RR: 16 (16 - 16)  SpO2: 94% (94% - 95%)    LABS:                        13.8   8.4   )-----------( 295      ( 2017 18:16 )             38.8     2017 18:16    143    |  105    |  13     ----------------------------<  137    3.6     |  32     |  0.56     Ca    9.1        2017 18:16    TPro  6.6    /  Alb  3.6    /  TBili  0.5    /  DBili  x      /  AST  17     /  ALT  25     /  AlkPhos  91     2017 18:16    LIVER FUNCTIONS - ( 2017 18:16 )  Alb: 3.6 g/dL / Pro: 6.6 gm/dL / ALK PHOS: 91 U/L / ALT: 25 U/L / AST: 17 U/L / GGT: x           PT/INR - ( 2017 18:16 )   PT: 12.5 sec;   INR: 1.15 ratio    PTT - ( 2017 18:16 )  PTT:28.6 sec  Urinalysis Basic - ( 2017 18:16 )  Color: Yellow / Appearance: Clear / S.020 / pH: x  Gluc: x / Ketone: Trace  / Bili: Negative / Urobili: Negative mg/dL   Blood: x / Protein: Negative mg/dL / Nitrite: Negative   Leuk Esterase: Trace / RBC: 0-2 /HPF / WBC 3-5   Sq Epi: x / Non Sq Epi: x / Bacteria: Occasional    PHYSICAL EXAM:  GENERAL: NAD, well-groomed, well-developed  HEAD:  Atraumatic, Normocephalic  EYES: EOMI, PERRLA, conjunctiva and sclera clear  HEENT: Moist mucous membranes  NECK: Supple, No JVD  NERVOUS SYSTEM:  Alert & Oriented X3, RT-sided paresis  CHEST/LUNG: Clear to auscultation bilaterally; No rales, rhonchi, wheezing, or rubs  HEART: Regular rate and rhythm; No murmurs, rubs, or gallops  ABDOMEN: Soft, Nontender, Nondistended; Bowel sounds present  GENITOURINARY- Voiding, no palpable bladder  EXTREMITIES:  2+ Peripheral Pulses, No clubbing, cyanosis, or edema  MUSCULOSKELTAL- No muscle tenderness, Muscle tone normal, No joint tenderness, no Joint swelling, Joint range of motion-normal  SKIN-no rash, no lesion       Daily Height in cm: 147.32 (2017 23:40)        DULoxetine 60milliGRAM(s) Oral daily  levETIRAcetam 500milliGRAM(s) Oral two times a day  lamoTRIgine 100milliGRAM(s) Oral at bedtime  lisinopril 10milliGRAM(s) Oral daily  atorvastatin 20milliGRAM(s) Oral at bedtime  DULoxetine 30milliGRAM(s) Oral daily  cyanocobalamin 1000MICROGram(s) Oral daily  oxybutynin 5milliGRAM(s) Oral every 48 hours  ARIPiprazole 2milliGRAM(s) Oral daily  heparin  Injectable 5000Unit(s) SubCutaneous every 8 hours    Assessment/Plan  #Weakness likely due to functional debility/decline/gait imbalance  Assessed by PT- needs KATYA    #H/o CVA with residual RT-sided paresis  Chronic    #No evidence of dehydration or UTI at present    #HTN/hyperlipidemia- stable    #Dispo- likely KATYA tomorrow

## 2017-06-09 VITALS
DIASTOLIC BLOOD PRESSURE: 77 MMHG | TEMPERATURE: 98 F | HEART RATE: 82 BPM | SYSTOLIC BLOOD PRESSURE: 132 MMHG | OXYGEN SATURATION: 96 % | RESPIRATION RATE: 16 BRPM

## 2017-06-09 LAB
-  AMPICILLIN: SIGNIFICANT CHANGE UP
-  CIPROFLOXACIN: SIGNIFICANT CHANGE UP
-  NITROFURANTOIN: SIGNIFICANT CHANGE UP
-  TETRACYCLINE: SIGNIFICANT CHANGE UP
-  VANCOMYCIN: SIGNIFICANT CHANGE UP
CULTURE RESULTS: SIGNIFICANT CHANGE UP
METHOD TYPE: SIGNIFICANT CHANGE UP
ORGANISM # SPEC MICROSCOPIC CNT: SIGNIFICANT CHANGE UP
ORGANISM # SPEC MICROSCOPIC CNT: SIGNIFICANT CHANGE UP
SPECIMEN SOURCE: SIGNIFICANT CHANGE UP

## 2017-06-09 RX ORDER — LEVETIRACETAM 250 MG/1
1 TABLET, FILM COATED ORAL
Qty: 0 | Refills: 0 | DISCHARGE
Start: 2017-06-09

## 2017-06-09 RX ORDER — LEVETIRACETAM 250 MG/1
2 TABLET, FILM COATED ORAL
Qty: 0 | Refills: 0 | DISCHARGE
Start: 2017-06-09

## 2017-06-09 RX ORDER — LAMOTRIGINE 25 MG/1
1 TABLET, ORALLY DISINTEGRATING ORAL
Qty: 0 | Refills: 0 | DISCHARGE
Start: 2017-06-09

## 2017-06-09 RX ORDER — LISINOPRIL 2.5 MG/1
1 TABLET ORAL
Qty: 0 | Refills: 0 | DISCHARGE
Start: 2017-06-09

## 2017-06-09 RX ORDER — HEPARIN SODIUM 5000 [USP'U]/ML
5000 INJECTION INTRAVENOUS; SUBCUTANEOUS
Qty: 0 | Refills: 0 | DISCHARGE
Start: 2017-06-09

## 2017-06-09 RX ADMIN — HEPARIN SODIUM 5000 UNIT(S): 5000 INJECTION INTRAVENOUS; SUBCUTANEOUS at 06:29

## 2017-06-09 RX ADMIN — ARIPIPRAZOLE 2 MILLIGRAM(S): 15 TABLET ORAL at 13:03

## 2017-06-09 RX ADMIN — DULOXETINE HYDROCHLORIDE 30 MILLIGRAM(S): 30 CAPSULE, DELAYED RELEASE ORAL at 13:00

## 2017-06-09 RX ADMIN — DULOXETINE HYDROCHLORIDE 60 MILLIGRAM(S): 30 CAPSULE, DELAYED RELEASE ORAL at 13:00

## 2017-06-09 RX ADMIN — LEVETIRACETAM 500 MILLIGRAM(S): 250 TABLET, FILM COATED ORAL at 06:31

## 2017-06-09 RX ADMIN — LISINOPRIL 10 MILLIGRAM(S): 2.5 TABLET ORAL at 06:31

## 2017-06-09 RX ADMIN — Medication 5 MILLIGRAM(S): at 06:31

## 2017-06-09 NOTE — DISCHARGE NOTE ADULT - CARE PLAN
Principal Discharge DX:	Functional weakness  Goal:	rehab  Instructions for follow-up, activity and diet:	outpatient f/u

## 2017-06-09 NOTE — DISCHARGE NOTE ADULT - MEDICATION SUMMARY - MEDICATIONS TO STOP TAKING
I will STOP taking the medications listed below when I get home from the hospital:    Crestor  -- 10  by mouth    amoxicillin-clavulanate 875 mg-125 mg oral tablet  -- 1 milligram(s) by mouth 2 times a day  -- Finish all this medication unless otherwise directed by prescriber.  Take with food or milk.    cefdinir 300 mg oral capsule  -- 1 cap(s) by mouth 2 times a day  -- Finish all this medication unless otherwise directed by prescriber.

## 2017-06-09 NOTE — DISCHARGE NOTE ADULT - PATIENT PORTAL LINK FT
“You can access the [employer] Patient Portal, offered by [facility], by registering with the following website: [email or web address]”

## 2017-06-09 NOTE — DISCHARGE NOTE ADULT - HOSPITAL COURSE
CC- weakness  HPI:  73 y/o female with PMHx of CVA 2 years ago with residual right-sided weakness, basal ganglia hemorrhage in 2014 secondarily to uncontrolled HTN, dyslipidemia, seizure d/o and recent UTI presents to the ED c/o abnormal gait, weakness, dehydration. As per her partner Jasen, patient has had a significant functional decline over several weeks  Pt can not stand up or sit up in bed without assistance. Pt uses a walker at home. Pt was here 3 days ago for weakness. Was d/c from ED for uti.  Pt has known speech impediment. Dr. Garza - PCP  Hospital course- uneventful, seen by PT- KATYA recommended    T(C): 36.7, Max: 36.8 (06-08 @ 15:48)  HR: 82 (80 - 84)  BP: 132/77 (121/62 - 142/77)  RR: 16 (16 - 16)  SpO2: 96% (95% - 98%)  Wt(kg): --    PHYSICAL EXAM:  GENERAL: NAD, well-groomed, well-developed  HEAD:  Atraumatic, Normocephalic  EYES: EOMI, PERRLA, conjunctiva and sclera clear  HEENT: Moist mucous membranes  NECK: Supple, No JVD  NERVOUS SYSTEM:  Alert & Oriented X3, RT-sided paresis  CHEST/LUNG: Clear to auscultation bilaterally; No rales, rhonchi, wheezing, or rubs  HEART: Regular rate and rhythm; No murmurs, rubs, or gallops  ABDOMEN: Soft, Nontender, Nondistended; Bowel sounds present  GENITOURINARY- Voiding, no palpable bladder  EXTREMITIES:  2+ Peripheral Pulses, No clubbing, cyanosis, or edema  MUSCULOSKELTAL- No muscle tenderness, Muscle tone normal, No joint tenderness, no Joint swelling, Joint range of motion-normal  SKIN-no rash, no lesion    Assessment/Plan  #Gait imbalance 2 to functional debility/decline  KATYA  #H/o CVA with residual RT-sided paresis  Chronic  #No evidence of UTI  Urine c/s insignificant growth. Asymptomatic. No abx recommended  #Seizure/depression- stable  #Dispo- KATYA today

## 2017-06-09 NOTE — DISCHARGE NOTE ADULT - MEDICATION SUMMARY - MEDICATIONS TO TAKE
I will START or STAY ON the medications listed below when I get home from the hospital:    lisinopril 10 mg oral tablet  -- 1 tab(s) by mouth once a day  -- Indication: For HTN    heparin  -- 5000 unit(s) subcutaneous every 8 hours 2 weeks or until fully ambulatory  -- Indication: For proph    lamoTRIgine 100 mg oral tablet  -- 1 tab(s) by mouth once a day (at bedtime)  -- Indication: For seizure    levETIRAcetam 500 mg oral tablet  -- 1 tab(s) by mouth 2 times a day  -- Indication: For seizure    Cymbalta 30 mg oral delayed release capsule  -- 1 cap(s) by mouth once a day  -- Indication: For depression    Cymbalta 60 mg oral delayed release capsule  -- 1 cap(s) by mouth once a day (at bedtime)  -- Indication: For depression    atorvastatin 20 mg oral tablet  -- 1 tab(s) by mouth once a day (at bedtime)  -- Indication: For cholesterol    Abilify 2 mg oral tablet  -- 1 tab(s) by mouth once a day  -- Indication: For depression    VESIcare 10 mg oral tablet  -- 1 tab(s) by mouth every 48 hours  -- Indication: For bladder    cyanocobalamin 1000 mcg oral tablet  -- 1 tab(s) by mouth once a day  -- Indication: For vitamin

## 2017-06-13 DIAGNOSIS — I69.351 HEMIPLEGIA AND HEMIPARESIS FOLLOWING CEREBRAL INFARCTION AFFECTING RIGHT DOMINANT SIDE: ICD-10-CM

## 2017-06-13 DIAGNOSIS — R47.9 UNSPECIFIED SPEECH DISTURBANCES: ICD-10-CM

## 2017-06-13 DIAGNOSIS — R53.81 OTHER MALAISE: ICD-10-CM

## 2017-06-13 DIAGNOSIS — E78.00 PURE HYPERCHOLESTEROLEMIA, UNSPECIFIED: ICD-10-CM

## 2017-06-13 DIAGNOSIS — E86.0 DEHYDRATION: ICD-10-CM

## 2017-06-13 DIAGNOSIS — R53.1 WEAKNESS: ICD-10-CM

## 2017-06-13 DIAGNOSIS — Z87.891 PERSONAL HISTORY OF NICOTINE DEPENDENCE: ICD-10-CM

## 2017-06-13 DIAGNOSIS — E78.5 HYPERLIPIDEMIA, UNSPECIFIED: ICD-10-CM

## 2017-06-13 DIAGNOSIS — F32.9 MAJOR DEPRESSIVE DISORDER, SINGLE EPISODE, UNSPECIFIED: ICD-10-CM

## 2017-06-13 DIAGNOSIS — R26.89 OTHER ABNORMALITIES OF GAIT AND MOBILITY: ICD-10-CM

## 2017-06-13 DIAGNOSIS — G40.909 EPILEPSY, UNSPECIFIED, NOT INTRACTABLE, WITHOUT STATUS EPILEPTICUS: ICD-10-CM

## 2017-06-15 ENCOUNTER — APPOINTMENT (OUTPATIENT)
Dept: PHYSICAL MEDICINE AND REHAB | Facility: CLINIC | Age: 72
End: 2017-06-15

## 2017-06-16 ENCOUNTER — APPOINTMENT (OUTPATIENT)
Dept: NEUROLOGY | Facility: CLINIC | Age: 72
End: 2017-06-16

## 2017-08-10 ENCOUNTER — APPOINTMENT (OUTPATIENT)
Dept: NEUROLOGY | Facility: CLINIC | Age: 72
End: 2017-08-10
Payer: MEDICARE

## 2017-08-10 VITALS
WEIGHT: 99 LBS | DIASTOLIC BLOOD PRESSURE: 64 MMHG | HEART RATE: 80 BPM | SYSTOLIC BLOOD PRESSURE: 105 MMHG | BODY MASS INDEX: 21.07 KG/M2 | HEIGHT: 57.5 IN

## 2017-08-10 PROCEDURE — 99214 OFFICE O/P EST MOD 30 MIN: CPT

## 2017-08-10 RX ORDER — CEFDINIR 300 MG/1
300 CAPSULE ORAL
Qty: 10 | Refills: 0 | Status: ACTIVE | COMMUNITY
Start: 2017-06-03

## 2017-08-10 RX ORDER — ARIPIPRAZOLE 5 MG/1
5 TABLET ORAL
Qty: 30 | Refills: 0 | Status: ACTIVE | COMMUNITY
Start: 2017-03-03

## 2017-08-10 RX ORDER — LAMOTRIGINE 100 MG/1
100 TABLET ORAL
Qty: 30 | Refills: 0 | Status: ACTIVE | COMMUNITY
Start: 2017-08-07

## 2017-08-29 ENCOUNTER — APPOINTMENT (OUTPATIENT)
Dept: PHYSICAL MEDICINE AND REHAB | Facility: CLINIC | Age: 72
End: 2017-08-29
Payer: MEDICARE

## 2017-08-29 ENCOUNTER — CHART COPY (OUTPATIENT)
Age: 72
End: 2017-08-29

## 2017-08-29 VITALS
OXYGEN SATURATION: 98 % | TEMPERATURE: 97.9 F | DIASTOLIC BLOOD PRESSURE: 81 MMHG | HEART RATE: 77 BPM | SYSTOLIC BLOOD PRESSURE: 134 MMHG

## 2017-08-29 DIAGNOSIS — M75.82 OTHER SHOULDER LESIONS, LEFT SHOULDER: ICD-10-CM

## 2017-08-29 PROCEDURE — 99213 OFFICE O/P EST LOW 20 MIN: CPT | Mod: 25

## 2017-08-29 PROCEDURE — 64644 CHEMODENERV 1 EXTREM 5/> MUS: CPT | Mod: RT

## 2017-08-29 PROCEDURE — 95874 GUIDE NERV DESTR NEEDLE EMG: CPT

## 2017-08-29 PROCEDURE — 64643 CHEMODENERV 1 EXTREM 1-4 EA: CPT | Mod: RT

## 2017-08-29 RX ORDER — ARIPIPRAZOLE 2 MG/1
2 TABLET ORAL
Refills: 0 | Status: ACTIVE | COMMUNITY

## 2017-08-30 ENCOUNTER — TRANSCRIPTION ENCOUNTER (OUTPATIENT)
Age: 72
End: 2017-08-30

## 2017-08-30 ENCOUNTER — CHART COPY (OUTPATIENT)
Age: 72
End: 2017-08-30

## 2017-08-30 RX ORDER — NABUMETONE 500 MG/1
500 TABLET, FILM COATED ORAL
Qty: 14 | Refills: 0 | Status: DISCONTINUED | COMMUNITY
Start: 2017-08-29 | End: 2017-08-30

## 2017-09-11 ENCOUNTER — RX RENEWAL (OUTPATIENT)
Age: 72
End: 2017-09-11

## 2017-09-11 RX ORDER — MELOXICAM 15 MG/1
15 TABLET ORAL DAILY
Qty: 7 | Refills: 0 | Status: ACTIVE | COMMUNITY
Start: 2017-08-30 | End: 1900-01-01

## 2017-10-06 ENCOUNTER — APPOINTMENT (OUTPATIENT)
Dept: NEUROLOGY | Facility: CLINIC | Age: 72
End: 2017-10-06
Payer: MEDICARE

## 2017-10-06 VITALS — HEART RATE: 81 BPM | DIASTOLIC BLOOD PRESSURE: 77 MMHG | SYSTOLIC BLOOD PRESSURE: 148 MMHG

## 2017-10-06 DIAGNOSIS — I61.9 NONTRAUMATIC INTRACEREBRAL HEMORRHAGE, UNSPECIFIED: ICD-10-CM

## 2017-10-06 DIAGNOSIS — R56.9 UNSPECIFIED CONVULSIONS: ICD-10-CM

## 2017-10-06 PROCEDURE — 99214 OFFICE O/P EST MOD 30 MIN: CPT

## 2017-10-06 RX ORDER — LAMOTRIGINE 25 MG/1
25 TABLET ORAL
Qty: 42 | Refills: 0 | Status: ACTIVE | COMMUNITY
Start: 2017-04-26

## 2017-10-11 ENCOUNTER — APPOINTMENT (OUTPATIENT)
Dept: PHYSICAL MEDICINE AND REHAB | Facility: CLINIC | Age: 72
End: 2017-10-11

## 2017-12-04 ENCOUNTER — APPOINTMENT (OUTPATIENT)
Dept: PHYSICAL MEDICINE AND REHAB | Facility: CLINIC | Age: 72
End: 2017-12-04
Payer: MEDICARE

## 2017-12-04 VITALS
SYSTOLIC BLOOD PRESSURE: 110 MMHG | HEART RATE: 78 BPM | OXYGEN SATURATION: 98 % | DIASTOLIC BLOOD PRESSURE: 72 MMHG | TEMPERATURE: 97.7 F

## 2017-12-04 PROCEDURE — 95874 GUIDE NERV DESTR NEEDLE EMG: CPT

## 2017-12-04 PROCEDURE — 64644 CHEMODENERV 1 EXTREM 5/> MUS: CPT | Mod: RT

## 2017-12-04 PROCEDURE — 64643 CHEMODENERV 1 EXTREM 1-4 EA: CPT | Mod: RT

## 2018-01-05 ENCOUNTER — RX RENEWAL (OUTPATIENT)
Age: 73
End: 2018-01-05

## 2018-01-14 ENCOUNTER — EMERGENCY (EMERGENCY)
Facility: HOSPITAL | Age: 73
LOS: 0 days | Discharge: HOME | End: 2018-01-14
Attending: EMERGENCY MEDICINE | Admitting: EMERGENCY MEDICINE
Payer: MEDICARE

## 2018-01-14 VITALS
SYSTOLIC BLOOD PRESSURE: 158 MMHG | OXYGEN SATURATION: 100 % | HEART RATE: 82 BPM | RESPIRATION RATE: 18 BRPM | DIASTOLIC BLOOD PRESSURE: 71 MMHG | TEMPERATURE: 98 F

## 2018-01-14 VITALS
WEIGHT: 100.09 LBS | HEIGHT: 64 IN | SYSTOLIC BLOOD PRESSURE: 162 MMHG | RESPIRATION RATE: 18 BRPM | HEART RATE: 81 BPM | DIASTOLIC BLOOD PRESSURE: 85 MMHG | OXYGEN SATURATION: 100 % | TEMPERATURE: 99 F

## 2018-01-14 DIAGNOSIS — R56.9 UNSPECIFIED CONVULSIONS: ICD-10-CM

## 2018-01-14 DIAGNOSIS — F05 DELIRIUM DUE TO KNOWN PHYSIOLOGICAL CONDITION: ICD-10-CM

## 2018-01-14 DIAGNOSIS — N39.0 URINARY TRACT INFECTION, SITE NOT SPECIFIED: ICD-10-CM

## 2018-01-14 DIAGNOSIS — Z79.899 OTHER LONG TERM (CURRENT) DRUG THERAPY: ICD-10-CM

## 2018-01-14 DIAGNOSIS — R69 ILLNESS, UNSPECIFIED: ICD-10-CM

## 2018-01-14 DIAGNOSIS — F41.9 ANXIETY DISORDER, UNSPECIFIED: ICD-10-CM

## 2018-01-14 DIAGNOSIS — Z86.73 PERSONAL HISTORY OF TRANSIENT ISCHEMIC ATTACK (TIA), AND CEREBRAL INFARCTION WITHOUT RESIDUAL DEFICITS: ICD-10-CM

## 2018-01-14 DIAGNOSIS — E78.5 HYPERLIPIDEMIA, UNSPECIFIED: ICD-10-CM

## 2018-01-14 LAB
AMPHET UR-MCNC: NEGATIVE — SIGNIFICANT CHANGE UP
ANION GAP SERPL CALC-SCNC: 5 MMOL/L — SIGNIFICANT CHANGE UP (ref 5–17)
APAP SERPL-MCNC: <2 UG/ML — LOW (ref 10–30)
APPEARANCE UR: CLEAR — SIGNIFICANT CHANGE UP
BACTERIA # UR AUTO: (no result)
BARBITURATES UR SCN-MCNC: NEGATIVE — SIGNIFICANT CHANGE UP
BASOPHILS # BLD AUTO: 0.1 K/UL — SIGNIFICANT CHANGE UP (ref 0–0.2)
BASOPHILS NFR BLD AUTO: 1 % — SIGNIFICANT CHANGE UP (ref 0–2)
BENZODIAZ UR-MCNC: NEGATIVE — SIGNIFICANT CHANGE UP
BILIRUB UR-MCNC: NEGATIVE — SIGNIFICANT CHANGE UP
BUN SERPL-MCNC: 11 MG/DL — SIGNIFICANT CHANGE UP (ref 7–23)
CALCIUM SERPL-MCNC: 9 MG/DL — SIGNIFICANT CHANGE UP (ref 8.5–10.1)
CHLORIDE SERPL-SCNC: 105 MMOL/L — SIGNIFICANT CHANGE UP (ref 96–108)
CO2 SERPL-SCNC: 33 MMOL/L — HIGH (ref 22–31)
COCAINE METAB.OTHER UR-MCNC: NEGATIVE — SIGNIFICANT CHANGE UP
COLOR SPEC: YELLOW — SIGNIFICANT CHANGE UP
CREAT SERPL-MCNC: 0.45 MG/DL — LOW (ref 0.5–1.3)
DIFF PNL FLD: (no result)
EOSINOPHIL # BLD AUTO: 0.1 K/UL — SIGNIFICANT CHANGE UP (ref 0–0.5)
EOSINOPHIL NFR BLD AUTO: 1.6 % — SIGNIFICANT CHANGE UP (ref 0–6)
EPI CELLS # UR: SIGNIFICANT CHANGE UP
ETHANOL SERPL-MCNC: <10 MG/DL — SIGNIFICANT CHANGE UP (ref 0–10)
GLUCOSE SERPL-MCNC: 86 MG/DL — SIGNIFICANT CHANGE UP (ref 70–99)
GLUCOSE UR QL: NEGATIVE MG/DL — SIGNIFICANT CHANGE UP
HCT VFR BLD CALC: 37.9 % — SIGNIFICANT CHANGE UP (ref 34.5–45)
HGB BLD-MCNC: 12.9 G/DL — SIGNIFICANT CHANGE UP (ref 11.5–15.5)
KETONES UR-MCNC: NEGATIVE — SIGNIFICANT CHANGE UP
LEUKOCYTE ESTERASE UR-ACNC: (no result)
LYMPHOCYTES # BLD AUTO: 2 K/UL — SIGNIFICANT CHANGE UP (ref 1–3.3)
LYMPHOCYTES # BLD AUTO: 23.8 % — SIGNIFICANT CHANGE UP (ref 13–44)
MCHC RBC-ENTMCNC: 31.4 PG — SIGNIFICANT CHANGE UP (ref 27–34)
MCHC RBC-ENTMCNC: 34 GM/DL — SIGNIFICANT CHANGE UP (ref 32–36)
MCV RBC AUTO: 92.4 FL — SIGNIFICANT CHANGE UP (ref 80–100)
METHADONE UR-MCNC: NEGATIVE — SIGNIFICANT CHANGE UP
MONOCYTES # BLD AUTO: 0.4 K/UL — SIGNIFICANT CHANGE UP (ref 0–0.9)
MONOCYTES NFR BLD AUTO: 5.2 % — SIGNIFICANT CHANGE UP (ref 2–14)
NEUTROPHILS # BLD AUTO: 5.7 K/UL — SIGNIFICANT CHANGE UP (ref 1.8–7.4)
NEUTROPHILS NFR BLD AUTO: 68.4 % — SIGNIFICANT CHANGE UP (ref 43–77)
NITRITE UR-MCNC: NEGATIVE — SIGNIFICANT CHANGE UP
OPIATES UR-MCNC: NEGATIVE — SIGNIFICANT CHANGE UP
PCP SPEC-MCNC: SIGNIFICANT CHANGE UP
PCP UR-MCNC: NEGATIVE — SIGNIFICANT CHANGE UP
PH UR: 6 — SIGNIFICANT CHANGE UP (ref 5–8)
PLATELET # BLD AUTO: 350 K/UL — SIGNIFICANT CHANGE UP (ref 150–400)
POTASSIUM SERPL-MCNC: 3.3 MMOL/L — LOW (ref 3.5–5.3)
POTASSIUM SERPL-SCNC: 3.3 MMOL/L — LOW (ref 3.5–5.3)
PROT UR-MCNC: NEGATIVE MG/DL — SIGNIFICANT CHANGE UP
RBC # BLD: 4.1 M/UL — SIGNIFICANT CHANGE UP (ref 3.8–5.2)
RBC # FLD: 11.3 % — SIGNIFICANT CHANGE UP (ref 10.3–14.5)
RBC CASTS # UR COMP ASSIST: (no result) /HPF (ref 0–4)
SALICYLATES SERPL-MCNC: <1.7 MG/DL — LOW (ref 2.8–20)
SODIUM SERPL-SCNC: 143 MMOL/L — SIGNIFICANT CHANGE UP (ref 135–145)
SP GR SPEC: 1.02 — SIGNIFICANT CHANGE UP (ref 1.01–1.02)
THC UR QL: NEGATIVE — SIGNIFICANT CHANGE UP
UROBILINOGEN FLD QL: NEGATIVE MG/DL — SIGNIFICANT CHANGE UP
WBC # BLD: 8.4 K/UL — SIGNIFICANT CHANGE UP (ref 3.8–10.5)
WBC # FLD AUTO: 8.4 K/UL — SIGNIFICANT CHANGE UP (ref 3.8–10.5)
WBC UR QL: >50

## 2018-01-14 PROCEDURE — 99285 EMERGENCY DEPT VISIT HI MDM: CPT

## 2018-01-14 PROCEDURE — 93010 ELECTROCARDIOGRAM REPORT: CPT

## 2018-01-14 RX ORDER — CEFUROXIME AXETIL 250 MG
250 TABLET ORAL ONCE
Qty: 0 | Refills: 0 | Status: COMPLETED | OUTPATIENT
Start: 2018-01-14 | End: 2018-01-14

## 2018-01-14 RX ORDER — CEFUROXIME AXETIL 250 MG
1 TABLET ORAL
Qty: 14 | Refills: 0
Start: 2018-01-14 | End: 2018-01-20

## 2018-01-14 RX ORDER — POTASSIUM CHLORIDE 20 MEQ
40 PACKET (EA) ORAL ONCE
Qty: 0 | Refills: 0 | Status: COMPLETED | OUTPATIENT
Start: 2018-01-14 | End: 2018-01-14

## 2018-01-14 RX ADMIN — Medication 40 MILLIEQUIVALENT(S): at 19:41

## 2018-01-14 RX ADMIN — Medication 250 MILLIGRAM(S): at 20:29

## 2018-01-14 NOTE — ED PROVIDER NOTE - PSYCHIATRIC, MLM
Alert and oriented to person, place, time/situation. normal affect. no apparent risk to self or others.

## 2018-01-14 NOTE — ED PROVIDER NOTE - OBJECTIVE STATEMENT
72 y-o F with PMHX Seizures, HLD, CVA presents to the ED BIB EMS c/o Anxiety attack. Pt states she was scared of her  for unknown reason and called EMS. Pt being evasive and won't tell full story. Pt notes she feels fine now and just wants to go home and see her family. Denies HI/SI. Denies any other acute Sx at this time.

## 2018-01-14 NOTE — ED BEHAVIORAL HEALTH ASSESSMENT NOTE - HPI (INCLUDE ILLNESS QUALITY, SEVERITY, DURATION, TIMING, CONTEXT, MODIFYING FACTORS, ASSOCIATED SIGNS AND SYMPTOMS)
72 yowmf, domiciled with , lives with 24/7 aide x 3 mo, PPH Depression, no current psych provider, since CVA 3 yrs ago, no in pt psych admissions or h/o self harm, no alcohol or drug abuse, seen at [facility]  for depressive symptoms following stroke, current meds Abilify, Cymbalta, Keppra, Lamictal, bib police after Pt called c/o aggression by .  Psych eval for "aggression".    Met with Pt in private room, initially irritable and refused to give urine or change clothes.  Pt has is tearful at times, reports calling police but now regrets, self deprecating, admits to depression, does not remember her meds, or name of any of her doctors, defers to .  Pt does not know the year, or date, christy know it is Eleazar.   reports Pt irritable, has been telling friends and her son that  has been hurting her which she now denies.  Denies SI current and past, denies alcohol abuse, drinks occasional glass of wine.  Pt hospitalized 3 mo last year due to UTI and results of UA pos for UTI.

## 2018-01-14 NOTE — ED PROVIDER NOTE - PROGRESS NOTE DETAILS
I Alon  attest that this documentation has been prepared under the direction and in the presence of Doctor Paula ED attending, Dr. Paula: received sign out from Dr. Hillman. Pt reported that she thought  was going to hurt her. Denies those thoughts at this time. Pending UA and psych consult at this time. Cleared by psych at this time; found to have UTI; will place on antibiotics; family aware- to follow up with PMD tomorrow; return to ED for worsening confusion; fever/chills or any other concerns. Nisa COSTA: Cleared by psych at this time; found to have UTI; will place on antibiotics; family aware- to follow up with PMD tomorrow; return to ED for worsening confusion; fever/chills or any other concerns.

## 2018-01-14 NOTE — ED BEHAVIORAL HEALTH ASSESSMENT NOTE - SUICIDE PROTECTIVE FACTORS
Identifies reasons for living/Future oriented/Fear of death or dying due to pain/suffering/Responsibility to family and others/Supportive social network or family

## 2018-01-14 NOTE — ED ADULT NURSE NOTE - OBJECTIVE STATEMENT
Pt states she wants to go home. Refuses to get undressed, refuses to speak with nurse or MD. Reported by EMS that pt was paranoid and stating to them that ' was out to get her'

## 2018-01-14 NOTE — ED PROVIDER NOTE - NS_ ATTENDINGSCRIBEDETAILS _ED_A_ED_FT
I, Duane Hillman MD,  performed the initial face to face bedside interview with this patient regarding history of present illness, review of symptoms and relevant past medical, social and family history.  I completed an independent physical examination.    The history, relevant review of systems, past medical and surgical history, medical decision making, and physical examination was documented by the scribe in my presence and I attest to the accuracy of the documentation.

## 2018-01-14 NOTE — ED ADULT NURSE REASSESSMENT NOTE - GENERAL PATIENT STATE
comfortable appearance/cooperative/Pt stated that staff can not use her Right Upper Extremity for routine blood pressures; Pink "Do Not Use Extremity" wrist band placed on Right wrist./smiling/interactive

## 2018-01-14 NOTE — ED BEHAVIORAL HEALTH ASSESSMENT NOTE - RISK ASSESSMENT
min risk of self harm, no h/o self harm or psych admissions.  [facility] with [facility].

## 2018-01-14 NOTE — ED BEHAVIORAL HEALTH ASSESSMENT NOTE - DESCRIPTION
Mood depressed, mildly irritable, cooperative, confused at times, disoriented to name of place, and time, reports year as "80 or 90" denies SI/HI/AH/VH.  No evidence of psychotic of thought disorder or lina, appears to be fluctuations in behavior and sensorium per reports with periods of agitation at home and transient paranoia, likely delirium. CVA, 3 yrs ago with defecits lives with 2nd , 1st  dies age 27, Cancer, 2 bio adult children and 2 step

## 2018-01-18 ENCOUNTER — APPOINTMENT (OUTPATIENT)
Dept: NEUROLOGY | Facility: CLINIC | Age: 73
End: 2018-01-18

## 2018-01-23 ENCOUNTER — APPOINTMENT (OUTPATIENT)
Dept: PHYSICAL MEDICINE AND REHAB | Facility: CLINIC | Age: 73
End: 2018-01-23
Payer: MEDICARE

## 2018-01-23 VITALS
WEIGHT: 96 LBS | SYSTOLIC BLOOD PRESSURE: 128 MMHG | DIASTOLIC BLOOD PRESSURE: 77 MMHG | BODY MASS INDEX: 20.41 KG/M2 | OXYGEN SATURATION: 97 % | TEMPERATURE: 98.5 F | HEART RATE: 85 BPM

## 2018-01-23 DIAGNOSIS — I69.315 COGNITIVE SOCIAL OR EMOTIONAL DEFICIT FOLLOWING CEREBRAL INFARCTION: ICD-10-CM

## 2018-01-23 PROCEDURE — 99213 OFFICE O/P EST LOW 20 MIN: CPT

## 2018-01-24 ENCOUNTER — TRANSCRIPTION ENCOUNTER (OUTPATIENT)
Age: 73
End: 2018-01-24

## 2018-01-24 RX ORDER — ONABOTULINUMTOXINA 100 [USP'U]/1
100 INJECTION, POWDER, LYOPHILIZED, FOR SOLUTION INTRADERMAL; INTRAMUSCULAR
Qty: 6 | Refills: 0 | Status: ACTIVE | OUTPATIENT
Start: 2018-01-24

## 2018-02-20 ENCOUNTER — RX RENEWAL (OUTPATIENT)
Age: 73
End: 2018-02-20

## 2018-02-20 RX ORDER — LEVETIRACETAM 500 MG/1
500 TABLET, FILM COATED, EXTENDED RELEASE ORAL DAILY
Qty: 180 | Refills: 0 | Status: ACTIVE | COMMUNITY
Start: 2017-02-14 | End: 1900-01-01

## 2018-04-02 ENCOUNTER — APPOINTMENT (OUTPATIENT)
Dept: PHYSICAL MEDICINE AND REHAB | Facility: CLINIC | Age: 73
End: 2018-04-02
Payer: MEDICARE

## 2018-04-02 VITALS
OXYGEN SATURATION: 97 % | TEMPERATURE: 97.4 F | HEART RATE: 74 BPM | DIASTOLIC BLOOD PRESSURE: 79 MMHG | SYSTOLIC BLOOD PRESSURE: 149 MMHG

## 2018-04-02 PROCEDURE — 64643 CHEMODENERV 1 EXTREM 1-4 EA: CPT

## 2018-04-02 PROCEDURE — 64644 CHEMODENERV 1 EXTREM 5/> MUS: CPT

## 2018-04-02 PROCEDURE — 95874 GUIDE NERV DESTR NEEDLE EMG: CPT | Mod: 59

## 2018-04-03 ENCOUNTER — RX RENEWAL (OUTPATIENT)
Age: 73
End: 2018-04-03

## 2018-04-13 ENCOUNTER — RX RENEWAL (OUTPATIENT)
Age: 73
End: 2018-04-13

## 2018-04-13 RX ORDER — GABAPENTIN 100 MG/1
100 CAPSULE ORAL
Qty: 30 | Refills: 2 | Status: ACTIVE | COMMUNITY
Start: 2017-10-06 | End: 1900-01-01

## 2018-05-14 ENCOUNTER — APPOINTMENT (OUTPATIENT)
Dept: PHYSICAL MEDICINE AND REHAB | Facility: CLINIC | Age: 73
End: 2018-05-14

## 2018-06-04 ENCOUNTER — APPOINTMENT (OUTPATIENT)
Dept: PHYSICAL MEDICINE AND REHAB | Facility: CLINIC | Age: 73
End: 2018-06-04
Payer: MEDICARE

## 2018-06-04 VITALS — DIASTOLIC BLOOD PRESSURE: 76 MMHG | OXYGEN SATURATION: 96 % | SYSTOLIC BLOOD PRESSURE: 124 MMHG | HEART RATE: 91 BPM

## 2018-06-04 DIAGNOSIS — M20.41 OTHER HAMMER TOE(S) (ACQUIRED), RIGHT FOOT: ICD-10-CM

## 2018-06-04 PROCEDURE — 99213 OFFICE O/P EST LOW 20 MIN: CPT | Mod: GC

## 2018-06-04 RX ORDER — ONABOTULINUMTOXINA 100 [USP'U]/1
100 INJECTION, POWDER, LYOPHILIZED, FOR SOLUTION INTRADERMAL; INTRAMUSCULAR
Qty: 6 | Refills: 0 | Status: ACTIVE | OUTPATIENT
Start: 2018-06-04

## 2018-06-05 ENCOUNTER — TRANSCRIPTION ENCOUNTER (OUTPATIENT)
Age: 73
End: 2018-06-05

## 2018-07-09 ENCOUNTER — CHART COPY (OUTPATIENT)
Age: 73
End: 2018-07-09

## 2018-07-09 ENCOUNTER — APPOINTMENT (OUTPATIENT)
Dept: PHYSICAL MEDICINE AND REHAB | Facility: CLINIC | Age: 73
End: 2018-07-09
Payer: MEDICARE

## 2018-07-09 VITALS
DIASTOLIC BLOOD PRESSURE: 72 MMHG | OXYGEN SATURATION: 100 % | SYSTOLIC BLOOD PRESSURE: 109 MMHG | HEART RATE: 79 BPM | TEMPERATURE: 97.8 F

## 2018-07-09 PROCEDURE — 64645 CHEMODENERV 1 EXTREM 5/> EA: CPT

## 2018-07-09 PROCEDURE — 95874 GUIDE NERV DESTR NEEDLE EMG: CPT | Mod: 59

## 2018-07-09 PROCEDURE — 64644 CHEMODENERV 1 EXTREM 5/> MUS: CPT

## 2018-07-10 ENCOUNTER — TRANSCRIPTION ENCOUNTER (OUTPATIENT)
Age: 73
End: 2018-07-10

## 2018-08-27 ENCOUNTER — APPOINTMENT (OUTPATIENT)
Dept: PHYSICAL MEDICINE AND REHAB | Facility: CLINIC | Age: 73
End: 2018-08-27
Payer: MEDICARE

## 2018-08-27 VITALS — SYSTOLIC BLOOD PRESSURE: 129 MMHG | DIASTOLIC BLOOD PRESSURE: 78 MMHG | HEART RATE: 71 BPM

## 2018-08-27 PROBLEM — R56.9 UNSPECIFIED CONVULSIONS: Chronic | Status: ACTIVE | Noted: 2017-06-03

## 2018-08-27 PROCEDURE — 99213 OFFICE O/P EST LOW 20 MIN: CPT

## 2018-08-27 RX ORDER — ONABOTULINUMTOXINA 100 [USP'U]/1
100 INJECTION, POWDER, LYOPHILIZED, FOR SOLUTION INTRADERMAL; INTRAMUSCULAR
Qty: 6 | Refills: 0 | Status: ACTIVE | OUTPATIENT
Start: 2018-08-27

## 2018-09-04 ENCOUNTER — RX RENEWAL (OUTPATIENT)
Age: 73
End: 2018-09-04

## 2018-10-15 ENCOUNTER — APPOINTMENT (OUTPATIENT)
Dept: PHYSICAL MEDICINE AND REHAB | Facility: CLINIC | Age: 73
End: 2018-10-15
Payer: MEDICARE

## 2018-10-15 VITALS — SYSTOLIC BLOOD PRESSURE: 121 MMHG | HEART RATE: 80 BPM | DIASTOLIC BLOOD PRESSURE: 77 MMHG

## 2018-10-15 PROCEDURE — 95874 GUIDE NERV DESTR NEEDLE EMG: CPT

## 2018-10-15 PROCEDURE — 64645 CHEMODENERV 1 EXTREM 5/> EA: CPT

## 2018-10-15 PROCEDURE — 64644 CHEMODENERV 1 EXTREM 5/> MUS: CPT

## 2018-10-16 ENCOUNTER — TRANSCRIPTION ENCOUNTER (OUTPATIENT)
Age: 73
End: 2018-10-16

## 2018-11-26 ENCOUNTER — APPOINTMENT (OUTPATIENT)
Dept: PHYSICAL MEDICINE AND REHAB | Facility: CLINIC | Age: 73
End: 2018-11-26
Payer: MEDICARE

## 2018-11-26 VITALS — DIASTOLIC BLOOD PRESSURE: 86 MMHG | HEART RATE: 83 BPM | SYSTOLIC BLOOD PRESSURE: 125 MMHG

## 2018-11-26 DIAGNOSIS — M75.102 UNSPECIFIED ROTATOR CUFF TEAR OR RUPTURE OF LEFT SHOULDER, NOT SPECIFIED AS TRAUMATIC: ICD-10-CM

## 2018-11-26 PROCEDURE — 99213 OFFICE O/P EST LOW 20 MIN: CPT

## 2018-11-26 RX ORDER — ONABOTULINUMTOXINA 100 [USP'U]/1
100 INJECTION, POWDER, LYOPHILIZED, FOR SOLUTION INTRADERMAL; INTRAMUSCULAR
Qty: 6 | Refills: 0 | Status: ACTIVE | OUTPATIENT
Start: 2018-11-26

## 2018-11-27 ENCOUNTER — TRANSCRIPTION ENCOUNTER (OUTPATIENT)
Age: 73
End: 2018-11-27

## 2019-01-28 ENCOUNTER — APPOINTMENT (OUTPATIENT)
Dept: PHYSICAL MEDICINE AND REHAB | Facility: CLINIC | Age: 74
End: 2019-01-28
Payer: MEDICARE

## 2019-01-28 VITALS — HEART RATE: 77 BPM | OXYGEN SATURATION: 98 % | DIASTOLIC BLOOD PRESSURE: 66 MMHG | SYSTOLIC BLOOD PRESSURE: 107 MMHG

## 2019-01-28 PROCEDURE — 64644 CHEMODENERV 1 EXTREM 5/> MUS: CPT

## 2019-01-28 PROCEDURE — 64645 CHEMODENERV 1 EXTREM 5/> EA: CPT

## 2019-01-28 PROCEDURE — 95874 GUIDE NERV DESTR NEEDLE EMG: CPT

## 2019-01-29 ENCOUNTER — TRANSCRIPTION ENCOUNTER (OUTPATIENT)
Age: 74
End: 2019-01-29

## 2019-02-04 ENCOUNTER — RX RENEWAL (OUTPATIENT)
Age: 74
End: 2019-02-04

## 2019-03-26 ENCOUNTER — RX RENEWAL (OUTPATIENT)
Age: 74
End: 2019-03-26

## 2019-04-03 ENCOUNTER — APPOINTMENT (OUTPATIENT)
Dept: PHYSICAL MEDICINE AND REHAB | Facility: CLINIC | Age: 74
End: 2019-04-03
Payer: MEDICARE

## 2019-04-03 VITALS — HEART RATE: 82 BPM | DIASTOLIC BLOOD PRESSURE: 72 MMHG | OXYGEN SATURATION: 95 % | SYSTOLIC BLOOD PRESSURE: 113 MMHG

## 2019-04-03 DIAGNOSIS — M24.541 CONTRACTURE, RIGHT HAND: ICD-10-CM

## 2019-04-03 DIAGNOSIS — R26.1 PARALYTIC GAIT: ICD-10-CM

## 2019-04-03 PROCEDURE — 99213 OFFICE O/P EST LOW 20 MIN: CPT

## 2019-04-03 RX ORDER — ONABOTULINUMTOXINA 100 [USP'U]/1
100 INJECTION, POWDER, LYOPHILIZED, FOR SOLUTION INTRADERMAL; INTRAMUSCULAR
Qty: 6 | Refills: 0 | Status: ACTIVE | OUTPATIENT
Start: 2019-04-03

## 2019-04-03 NOTE — PHYSICAL EXAM
[FreeTextEntry1] : General: Well developed female in no apparent distress. Patient is awake, alert, as follows 2 step commands. Cooperative with examination.\par \par Motor:\par Right elbow flexors MAS = 1.\par Right wrist flexors MAS = 0-1, passive extension to 30-40 degrees.\par Right MCP flexors MAS = 0-1, except digit 5, MAS=2\par Right FDS MAS = 0 with a wrist flexed, MAS = 0 with the wrist extended. Patient posturing with neutral position at the PIP joints.\par Right FDP MAS = 0 to wrist flex, MAS = 0 to wrist extended.\par Right extensor digitorum MAS = 0-1\par \par Right lower extremity:\par Right toe flexors MAS = 0 ankle plantarflex. MAS = 1 with passive dorsiflexion.\par Right EHL MAS = 2. Maintains foot in neutral posture while sitting.\par \par Functional status: Sit to stand transfer with contact guard. Patient ambulated with left AFO with heel strike noted, mild genu recurvatum.

## 2019-04-03 NOTE — HISTORY OF PRESENT ILLNESS
[FreeTextEntry1] : Patient is a 74-year-old female history a right spastic hemiparesis who underwent a Botox injection on January 28, 2019. Patient and  report good tone reduction in the right upper extremity and at the toes. No pain complaints at the right upper extremity, no pain complaints at the toes with ambulation. Patient is tolerating her AFO well, no skin breakdown.  does report leg inversion during ambulation. Patient has been receiving outpatient physical therapy and occupational therapy.  Occupational therapies requesting prescription for a static progressive hand splint on the right. Patient with continued stiffness of the left shoulder. Patient continues to ambulate with close contact guard to min assist.

## 2019-04-03 NOTE — REVIEW OF SYSTEMS
[Muscle Weakness] : muscle weakness [Difficulty Walking] : difficulty walking [Negative] : Gastrointestinal [Fever] : no fever

## 2019-04-03 NOTE — ASSESSMENT
[FreeTextEntry1] : Patient is a 74-year-old female history of right spastic hemiparesis who underwent a Botox injection on Jan. 28 2019. Good tone reduction. Will increase the dose to the extensor Digitorum and EHL. Prescription provided for static progressive resting splint for RUE, see Rx. Patient to continue outpatient PT and OT. See new Botox injection protocol:\par \par Right brachialis ---------------------30 units\par Right extensor digitorum -------------60 units\par Right FCR ------------------------------40 units\par Right FCU ------------------------------40 units\par Right lumbricals------------------------ 80 units\par Right flexor hallucis longus----------- 60 units\par Right flexor digitorum longus ---------70 units\par Right flexor digitorum brevis ----------40 units\par Right extensor hallucis longus-------- 80 units\par Right tibialis posterior----------------- 100 units\par \par Total------------------------------------ 600 units

## 2019-05-06 ENCOUNTER — APPOINTMENT (OUTPATIENT)
Dept: PHYSICAL MEDICINE AND REHAB | Facility: CLINIC | Age: 74
End: 2019-05-06
Payer: MEDICARE

## 2019-05-06 VITALS — OXYGEN SATURATION: 95 % | SYSTOLIC BLOOD PRESSURE: 103 MMHG | DIASTOLIC BLOOD PRESSURE: 67 MMHG | HEART RATE: 73 BPM

## 2019-05-06 DIAGNOSIS — G81.11 SPASTIC HEMIPLEGIA AFFECTING RIGHT DOMINANT SIDE: ICD-10-CM

## 2019-05-06 PROCEDURE — 64645 CHEMODENERV 1 EXTREM 5/> EA: CPT

## 2019-05-06 PROCEDURE — 64644 CHEMODENERV 1 EXTREM 5/> MUS: CPT

## 2019-05-06 PROCEDURE — 95874 GUIDE NERV DESTR NEEDLE EMG: CPT

## 2019-05-07 ENCOUNTER — TRANSCRIPTION ENCOUNTER (OUTPATIENT)
Age: 74
End: 2019-05-07

## 2019-06-07 ENCOUNTER — INPATIENT (INPATIENT)
Facility: HOSPITAL | Age: 74
LOS: 3 days | End: 2019-06-11
Attending: SURGERY | Admitting: SURGERY
Payer: MEDICARE

## 2019-06-07 VITALS
DIASTOLIC BLOOD PRESSURE: 68 MMHG | SYSTOLIC BLOOD PRESSURE: 134 MMHG | OXYGEN SATURATION: 94 % | HEIGHT: 59 IN | TEMPERATURE: 98 F | HEART RATE: 73 BPM | WEIGHT: 110.01 LBS | RESPIRATION RATE: 16 BRPM

## 2019-06-07 DIAGNOSIS — R69 ILLNESS, UNSPECIFIED: ICD-10-CM

## 2019-06-07 DIAGNOSIS — F05 DELIRIUM DUE TO KNOWN PHYSIOLOGICAL CONDITION: ICD-10-CM

## 2019-06-07 LAB
ALBUMIN SERPL ELPH-MCNC: 3.3 G/DL — SIGNIFICANT CHANGE UP (ref 3.3–5)
ALP SERPL-CCNC: 94 U/L — SIGNIFICANT CHANGE UP (ref 40–120)
ALT FLD-CCNC: 21 U/L — SIGNIFICANT CHANGE UP (ref 12–78)
ANION GAP SERPL CALC-SCNC: 5 MMOL/L — SIGNIFICANT CHANGE UP (ref 5–17)
APAP SERPL-MCNC: <2 UG/ML — LOW (ref 10–30)
AST SERPL-CCNC: 18 U/L — SIGNIFICANT CHANGE UP (ref 15–37)
BASOPHILS # BLD AUTO: 0.03 K/UL — SIGNIFICANT CHANGE UP (ref 0–0.2)
BASOPHILS NFR BLD AUTO: 0.4 % — SIGNIFICANT CHANGE UP (ref 0–2)
BILIRUB SERPL-MCNC: 0.5 MG/DL — SIGNIFICANT CHANGE UP (ref 0.2–1.2)
BUN SERPL-MCNC: 11 MG/DL — SIGNIFICANT CHANGE UP (ref 7–23)
CALCIUM SERPL-MCNC: 9.1 MG/DL — SIGNIFICANT CHANGE UP (ref 8.5–10.1)
CHLORIDE SERPL-SCNC: 104 MMOL/L — SIGNIFICANT CHANGE UP (ref 96–108)
CO2 SERPL-SCNC: 33 MMOL/L — HIGH (ref 22–31)
CREAT SERPL-MCNC: 0.66 MG/DL — SIGNIFICANT CHANGE UP (ref 0.5–1.3)
EOSINOPHIL # BLD AUTO: 0.08 K/UL — SIGNIFICANT CHANGE UP (ref 0–0.5)
EOSINOPHIL NFR BLD AUTO: 1.2 % — SIGNIFICANT CHANGE UP (ref 0–6)
ETHANOL SERPL-MCNC: <10 MG/DL — SIGNIFICANT CHANGE UP (ref 0–10)
GLUCOSE SERPL-MCNC: 88 MG/DL — SIGNIFICANT CHANGE UP (ref 70–99)
HCT VFR BLD CALC: 36 % — SIGNIFICANT CHANGE UP (ref 34.5–45)
HGB BLD-MCNC: 12.1 G/DL — SIGNIFICANT CHANGE UP (ref 11.5–15.5)
IMM GRANULOCYTES NFR BLD AUTO: 0.1 % — SIGNIFICANT CHANGE UP (ref 0–1.5)
LYMPHOCYTES # BLD AUTO: 1.33 K/UL — SIGNIFICANT CHANGE UP (ref 1–3.3)
LYMPHOCYTES # BLD AUTO: 19.5 % — SIGNIFICANT CHANGE UP (ref 13–44)
MAGNESIUM SERPL-MCNC: 1.9 MG/DL — SIGNIFICANT CHANGE UP (ref 1.6–2.6)
MCHC RBC-ENTMCNC: 31.5 PG — SIGNIFICANT CHANGE UP (ref 27–34)
MCHC RBC-ENTMCNC: 33.6 GM/DL — SIGNIFICANT CHANGE UP (ref 32–36)
MCV RBC AUTO: 93.8 FL — SIGNIFICANT CHANGE UP (ref 80–100)
MONOCYTES # BLD AUTO: 0.42 K/UL — SIGNIFICANT CHANGE UP (ref 0–0.9)
MONOCYTES NFR BLD AUTO: 6.2 % — SIGNIFICANT CHANGE UP (ref 2–14)
NEUTROPHILS # BLD AUTO: 4.95 K/UL — SIGNIFICANT CHANGE UP (ref 1.8–7.4)
NEUTROPHILS NFR BLD AUTO: 72.6 % — SIGNIFICANT CHANGE UP (ref 43–77)
PLATELET # BLD AUTO: 283 K/UL — SIGNIFICANT CHANGE UP (ref 150–400)
POTASSIUM SERPL-MCNC: 4 MMOL/L — SIGNIFICANT CHANGE UP (ref 3.5–5.3)
POTASSIUM SERPL-SCNC: 4 MMOL/L — SIGNIFICANT CHANGE UP (ref 3.5–5.3)
PROT SERPL-MCNC: 6.3 GM/DL — SIGNIFICANT CHANGE UP (ref 6–8.3)
RBC # BLD: 3.84 M/UL — SIGNIFICANT CHANGE UP (ref 3.8–5.2)
RBC # FLD: 11.9 % — SIGNIFICANT CHANGE UP (ref 10.3–14.5)
SALICYLATES SERPL-MCNC: <1.7 MG/DL — LOW (ref 2.8–20)
SODIUM SERPL-SCNC: 142 MMOL/L — SIGNIFICANT CHANGE UP (ref 135–145)
TSH SERPL-MCNC: 0.9 UU/ML — SIGNIFICANT CHANGE UP (ref 0.34–4.82)
WBC # BLD: 6.82 K/UL — SIGNIFICANT CHANGE UP (ref 3.8–10.5)
WBC # FLD AUTO: 6.82 K/UL — SIGNIFICANT CHANGE UP (ref 3.8–10.5)

## 2019-06-07 PROCEDURE — 93010 ELECTROCARDIOGRAM REPORT: CPT

## 2019-06-07 PROCEDURE — 99285 EMERGENCY DEPT VISIT HI MDM: CPT

## 2019-06-07 PROCEDURE — 72170 X-RAY EXAM OF PELVIS: CPT | Mod: 26

## 2019-06-07 PROCEDURE — 71045 X-RAY EXAM CHEST 1 VIEW: CPT | Mod: 26

## 2019-06-07 PROCEDURE — 70450 CT HEAD/BRAIN W/O DYE: CPT | Mod: 26

## 2019-06-07 PROCEDURE — 90792 PSYCH DIAG EVAL W/MED SRVCS: CPT

## 2019-06-07 RX ORDER — DULOXETINE HYDROCHLORIDE 30 MG/1
1 CAPSULE, DELAYED RELEASE ORAL
Qty: 0 | Refills: 0 | DISCHARGE

## 2019-06-07 RX ORDER — SOLIFENACIN SUCCINATE 10 MG/1
1 TABLET ORAL
Qty: 0 | Refills: 0 | DISCHARGE

## 2019-06-07 RX ORDER — SODIUM CHLORIDE 9 MG/ML
500 INJECTION INTRAMUSCULAR; INTRAVENOUS; SUBCUTANEOUS ONCE
Refills: 0 | Status: COMPLETED | OUTPATIENT
Start: 2019-06-07 | End: 2019-06-07

## 2019-06-07 RX ORDER — OXYBUTYNIN CHLORIDE 5 MG
5 TABLET ORAL DAILY
Refills: 0 | Status: DISCONTINUED | OUTPATIENT
Start: 2019-06-07 | End: 2019-06-08

## 2019-06-07 RX ORDER — ARIPIPRAZOLE 15 MG/1
5 TABLET ORAL DAILY
Refills: 0 | Status: DISCONTINUED | OUTPATIENT
Start: 2019-06-07 | End: 2019-06-10

## 2019-06-07 RX ORDER — ONDANSETRON 8 MG/1
4 TABLET, FILM COATED ORAL EVERY 6 HOURS
Refills: 0 | Status: DISCONTINUED | OUTPATIENT
Start: 2019-06-07 | End: 2019-06-11

## 2019-06-07 RX ORDER — DOCUSATE SODIUM 100 MG
100 CAPSULE ORAL THREE TIMES A DAY
Refills: 0 | Status: DISCONTINUED | OUTPATIENT
Start: 2019-06-07 | End: 2019-06-11

## 2019-06-07 RX ORDER — MULTIVIT-MIN/FERROUS GLUCONATE 9 MG/15 ML
1 LIQUID (ML) ORAL DAILY
Refills: 0 | Status: DISCONTINUED | OUTPATIENT
Start: 2019-06-07 | End: 2019-06-11

## 2019-06-07 RX ORDER — BUPROPION HYDROCHLORIDE 150 MG/1
1 TABLET, EXTENDED RELEASE ORAL
Qty: 0 | Refills: 0 | DISCHARGE

## 2019-06-07 RX ORDER — SENNA PLUS 8.6 MG/1
2 TABLET ORAL AT BEDTIME
Refills: 0 | Status: DISCONTINUED | OUTPATIENT
Start: 2019-06-07 | End: 2019-06-11

## 2019-06-07 RX ORDER — ENOXAPARIN SODIUM 100 MG/ML
40 INJECTION SUBCUTANEOUS DAILY
Refills: 0 | Status: DISCONTINUED | OUTPATIENT
Start: 2019-06-07 | End: 2019-06-10

## 2019-06-07 RX ORDER — LEVETIRACETAM 250 MG/1
1000 TABLET, FILM COATED ORAL AT BEDTIME
Refills: 0 | Status: DISCONTINUED | OUTPATIENT
Start: 2019-06-07 | End: 2019-06-08

## 2019-06-07 RX ORDER — PREGABALIN 225 MG/1
1 CAPSULE ORAL
Qty: 0 | Refills: 0 | DISCHARGE

## 2019-06-07 RX ORDER — ARIPIPRAZOLE 15 MG/1
1 TABLET ORAL
Qty: 0 | Refills: 0 | DISCHARGE

## 2019-06-07 RX ORDER — ROSUVASTATIN CALCIUM 5 MG/1
1 TABLET ORAL
Qty: 0 | Refills: 0 | DISCHARGE

## 2019-06-07 RX ADMIN — Medication 1 TABLET(S): at 23:05

## 2019-06-07 RX ADMIN — SODIUM CHLORIDE 500 MILLILITER(S): 9 INJECTION INTRAMUSCULAR; INTRAVENOUS; SUBCUTANEOUS at 12:20

## 2019-06-07 RX ADMIN — LEVETIRACETAM 1000 MILLIGRAM(S): 250 TABLET, FILM COATED ORAL at 23:06

## 2019-06-07 NOTE — ED PROVIDER NOTE - NS ED ROS FT
Constitutional: No fever or chills  Eyes: No visual changes  HEENT: No throat pain  CV: No chest pain  Resp: No SOB no cough  GI: No abd pain, nausea or vomiting  : No dysuria  MSK: No musculoskeletal pain  Skin: No rash  Neuro: No headache Constitutional: No fever or chills. +decreased PO intake.   Eyes: No visual changes  HEENT: No throat pain  CV: No chest pain  Resp: No SOB no cough  GI: No abd pain, nausea or vomiting  : No dysuria  MSK: No musculoskeletal pain  Skin: No rash  Neuro: No headache. +weakness.   Psych: +depression

## 2019-06-07 NOTE — ED ADULT NURSE NOTE - OBJECTIVE STATEMENT
Patient brought in with  and aide from home.  Aide heard a loud noise and went into the room and found the patient on the floor with no response.   states his wife was behavioral this morning which is not abnormal for her.  He states that she has been recently losing weight and has not been eating or drinking much at all.  She has a history of a stroke.  She is oriented to person and place but is unable to tell you the year or what happened.  She is not on blood thinners.  She has a history of seizures as well.  She has episodes of crying and saying she wants to go home.

## 2019-06-07 NOTE — ED PROVIDER NOTE - CLINICAL SUMMARY MEDICAL DECISION MAKING FREE TEXT BOX
74 F presents to ED for weakness and fall today. Pt has failure to thrive. Pt is having worsening depression. Exam with cachetic pt. No focal signs of trauma. Concern that worsening depression causing pt not to eat. Will obtain labs, images, psych consult, reassess.

## 2019-06-07 NOTE — ED PROVIDER NOTE - NS_ ATTENDINGSCRIBEDETAILS _ED_A_ED_FT
I, Blayne Haro MD,  performed the initial face to face bedside interview with this patient regarding history of present illness, review of symptoms and relevant past medical, social and family history.  I completed an independent physical examination.  I was the initial provider who evaluated this patient.  The history, relevant review of systems, past medical and surgical history, medical decision making, and physical examination was documented by the scribe in my presence and I attest to the accuracy of the documentation.

## 2019-06-07 NOTE — ED ADULT NURSE REASSESSMENT NOTE - NSIMPLEMENTINTERV_GEN_ALL_ED
Implemented All Fall with Harm Risk Interventions:  [place] to call system. Call bell, personal items and telephone within reach. Instruct patient to call for assistance. Room bathroom lighting operational. Non-slip footwear when patient is off stretcher. Physically safe environment: no spills, clutter or unnecessary equipment. Stretcher in lowest position, wheels locked, appropriate side rails in place. Provide visual cue, wrist band, yellow gown, etc. Monitor gait and stability. Monitor for mental status changes and reorient to person, place, and time. Review medications for side effects contributing to fall risk. Reinforce activity limits and safety measures with patient and family. Provide visual clues: red socks.

## 2019-06-07 NOTE — H&P ADULT - HISTORY OF PRESENT ILLNESS
75 y/o F with PMHx of depression, seizure, HLD, basal ganglia hemorrhage, CVA with R sided residual weakness, ambulation with assistance presenting to the ED for recent weight loss and weakness.  at bedside states pt has not been eating. Pt has reportedly had a ~10lb weight loss over the course of 1 month.  also notes that pt fell this morning because she did not take her morning medications and because she refuses to eat or drink. Pt was unable to get up on her own or ambulate after fall.  CT head was negative.   Pt seen by Psych in ER.

## 2019-06-07 NOTE — ED ADULT TRIAGE NOTE - CHIEF COMPLAINT QUOTE
Pt BIBEMS with hx of CVA right sided residual weakness for evaluation of AMS and SI. Pt's spouse called EMS and stated she has SI - pt is tearful on arrival and states "I don't want to be here"

## 2019-06-07 NOTE — ED PROVIDER NOTE - PHYSICAL EXAMINATION
Constitutional: mild distress AAOx3  Eyes: PERRLA EOMI  Head: Normocephalic atraumatic  Mouth: MMM  Cardiac: regular rate   Resp: Lungs CTAB  GI: Abd s/nt/nd  Neuro: CN2-12 intact  Skin: No rashes Constitutional: mild distress AAOx3. +thin/frail appearing. +cachetic  Eyes: PERRLA EOMI  Head: Normocephalic atraumatic  Mouth: MMM  Cardiac: regular rate   Resp: Lungs CTAB  GI: Abd s/nt/nd  Neuro: CN2-12 intact  Skin: No rashes  MSK: No tenderness to chest wall. No C-spine tenderness. Pelvis stable. Constitutional: mild distress AAOx3. +thin/frail appearing. +cachetic  Eyes: PERRLA EOMI  Head: Normocephalic atraumatic no raccoon eyes no hook sign  Mouth: MMM  Cardiac: regular rate   Resp: Lungs CTAB  GI: Abd s/nt/nd  Neuro: CN2-12 intact  Skin: No rashes  MSK: No tenderness to chest wall. No C-spine tenderness. Pelvis stable.

## 2019-06-07 NOTE — ED PROVIDER NOTE - SHIFT CHANGE DETAILS
pt signed out to Dr. Schultz pending ua and admit for ftt depression. Blayne Haro M.D., Attending Physician

## 2019-06-07 NOTE — ED PROVIDER NOTE - OBJECTIVE STATEMENT
73 y/o F with PMHx of seizure, HLD, basal ganglia hemorrhage, CVA with R sided residual weakness presenting to the ED for AMS and SI. Pt's spouse called EMS and stated pt has SI.        ***************************** 75 y/o F with PMHx of depression, seizure, HLD, basal ganglia hemorrhage, CVA with R sided residual weakness, ambulation with assistance presenting to the ED for recent weight loss and weakness.  at bedside states pt has not been eating. Pt has reportedly had a ~15lb weight loss over the course of 1 month.  also notes that pt fell this morning because she did not take her morning medications and because she refuses to eat or drink. Pt was unable to get up on her own or ambulate after fall. MD and physical therapist told pt's  that symptoms are caused by malnutrition. Despite triage note, pt denies SI. Denies head strike, LOC, or N/V. PMD: Dr. Prakash.

## 2019-06-07 NOTE — ED BEHAVIORAL HEALTH ASSESSMENT NOTE - RISK ASSESSMENT
Warm
min risk of self harm, no h/o self harm or psych admissions.  [facility].

## 2019-06-07 NOTE — ED PROVIDER NOTE - PROGRESS NOTE DETAILS
Jack PATEL, PGY-4: pt requires admission for FTT. Pt refuses admission - seen by psychiatry who determined that pt does not have capacity 2/2 AMS. Will admit to medicine with psych c/s. D/w Dr Hdez.

## 2019-06-07 NOTE — ED BEHAVIORAL HEALTH ASSESSMENT NOTE - DESCRIPTION
Mood depressed, irritable, crying, uncooperative, confused.  Mild agitation when with or with out .   Pt currently irrational and illogical. Pt private aid present throughout interview. CVA, 4 yrs ago with deficits lives with 2nd , 1st   age 29, Cancer, remarried with 2 bio adult children and 2 step

## 2019-06-07 NOTE — ED PROVIDER NOTE - CARE PLAN
Principal Discharge DX:	Failure to thrive in adult  Secondary Diagnosis:	Weakness  Secondary Diagnosis:	Altered mental status

## 2019-06-07 NOTE — H&P ADULT - NSICDXPASTMEDICALHX_GEN_ALL_CORE_FT
PAST MEDICAL HISTORY:  Basal ganglia hemorrhage     CVA (cerebral infarction)     Depression     High cholesterol     Hyperlipemia     Seizure

## 2019-06-07 NOTE — ED BEHAVIORAL HEALTH ASSESSMENT NOTE - HPI (INCLUDE ILLNESS QUALITY, SEVERITY, DURATION, TIMING, CONTEXT, MODIFYING FACTORS, ASSOCIATED SIGNS AND SYMPTOMS)
72 yowmf, domiciled with , lives with 24/7 aid except on weekends when  is primary caregiver, PPH Depression, in treatment with Dr Alessandra Moses, since CVA 4 yrs ago, no in pt psych admissions or h/o self harm, no alcohol or drug abuse,   current psych meds Abilify2 mg, Cymbalta 90 mg/day, Lamictal 100 mg hs, and per Dr Mcwilliams she has also been prescribed Wellbutrin  mg qd, since April,  bib  due to poor po intake and irritability  Psych eval  depression.    Pt presents tearful, calling out for her , crying, min cooperative with eval, poor eye contact, not well engaged, internally preoccupied, repeated saying, "I want to go home",  blunted affect, knows month in  and year is 19, and her , otherwise is disorganized, labile, irritable, responds inappropriately to questions, ie asked why she is here in hospital and stated, He is drinking my coffee.  Then with further exploration stated, "I was doing the wrong thing" but unable to elaborate.  Then made statement, " I wanted breakfast".  Pt denies making suicidal statement,  stated she made when with EMS, and denies SI/HI.  Most questions she did not reply and cried asking for her .  She is unable to make full sentences in meaningful manner     reports she has been eating poorly over past month with approx 10 lb weight loss, and has been perseverative when asked about what food she wants.     reports she has periods of altered mental status and confusion and sometimes does not make sense.  He brought her to PCP Dr Garza who advised she eat or will be hospitalized.  The PT provider with home care reports she cannot participate due to muscle atrophy.    Today,  Pt  Danielito reports he became angry when she slapped her meds out of husbands hand after several attempts to get her to take them and to eat, so he called 911.

## 2019-06-07 NOTE — ED ADULT NURSE NOTE - NSIMPLEMENTINTERV_GEN_ALL_ED
Implemented All Fall Risk Interventions:  [place] to call system. Call bell, personal items and telephone within reach. Instruct patient to call for assistance. Room bathroom lighting operational. Non-slip footwear when patient is off stretcher. Physically safe environment: no spills, clutter or unnecessary equipment. Stretcher in lowest position, wheels locked, appropriate side rails in place. Provide visual cue, wrist band, yellow gown, etc. Monitor gait and stability. Monitor for mental status changes and reorient to person, place, and time. Review medications for side effects contributing to fall risk. Reinforce activity limits and safety measures with patient and family.

## 2019-06-07 NOTE — ED ADULT NURSE REASSESSMENT NOTE - NS ED NURSE REASSESS COMMENT FT1
Dr. Haro at bedside and determined that 1:1 is not needed at this time.  Patient in a gown however she is refusing to take off her shorts.  Will continue to monitor.
psych NP at bedside.
Pt received alert to self-confused to time/location and situation. When asked questions-pt starts crying and states "I want to go home". Plan of care discussed with patient and spouse at bedside-pt eating dinner, encouraged by  and nurse to eat. Pt agitated at times and frequently requests to go home. Home aide at bedside and enhanced supervision being done. Right foot brace on patient (spouse states this is due to right sided weakness since CVA) and wears a right hand brace at home overnight that he needs to go home and get. All needs addressed and safety maintained.

## 2019-06-07 NOTE — H&P ADULT - NSHPLABSRESULTS_GEN_ALL_CORE
12.1   6.82  )-----------( 283      ( 07 Jun 2019 12:15 )             36.0       CBC Full  -  ( 07 Jun 2019 12:15 )  WBC Count : 6.82 K/uL  RBC Count : 3.84 M/uL  Hemoglobin : 12.1 g/dL  Hematocrit : 36.0 %  Platelet Count - Automated : 283 K/uL  Mean Cell Volume : 93.8 fl  Mean Cell Hemoglobin : 31.5 pg  Mean Cell Hemoglobin Concentration : 33.6 gm/dL  Auto Neutrophil # : 4.95 K/uL  Auto Lymphocyte # : 1.33 K/uL  Auto Monocyte # : 0.42 K/uL  Auto Eosinophil # : 0.08 K/uL  Auto Basophil # : 0.03 K/uL  Auto Neutrophil % : 72.6 %  Auto Lymphocyte % : 19.5 %  Auto Monocyte % : 6.2 %  Auto Eosinophil % : 1.2 %  Auto Basophil % : 0.4 %      06-07    142  |  104  |  11  ----------------------------<  88  4.0   |  33<H>  |  0.66    Ca    9.1      07 Jun 2019 12:15  Mg     1.9     06-07    TPro  6.3  /  Alb  3.3  /  TBili  0.5  /  DBili  x   /  AST  18  /  ALT  21  /  AlkPhos  94  06-07      LIVER FUNCTIONS - ( 07 Jun 2019 12:15 )  Alb: 3.3 g/dL / Pro: 6.3 gm/dL / ALK PHOS: 94 U/L / ALT: 21 U/L / AST: 18 U/L / GGT: x                               MEDICATIONS  (STANDING):  docusate sodium 100 milliGRAM(s) Oral three times a day  levETIRAcetam 1000 milliGRAM(s) Oral at bedtime  multivitamin/minerals 1 Tablet(s) Oral daily  oxybutynin 5 milliGRAM(s) Oral daily

## 2019-06-07 NOTE — H&P ADULT - ASSESSMENT
73 y/o F with PMHx of depression, seizure, HLD, basal ganglia hemorrhage, CVA with R sided residual weakness, ambulation with assistance; admitted with     1) FTT: admit  iv fluids  mvi  calorie count  nutrition consult  hold statin    2) Delirium: acute vs chronic:  appreciate psych consult  Wellbutrin and Cymbalta put on hold  Abilify increased to 5 mg     3) HLD: hold statin for now for FTT  check lipid profile    4) old CVA:  supportive care    5) DVT PPX: lovenox    IMPROVE VTE Individual Risk Assessment    RISK                                                                Points    [  ] Previous VTE                                                  3    [  ] Thrombophilia                                               2    [ x ] Lower limb paralysis                                      2        (unable to hold up >15 seconds)      [  ] Current Cancer                                              2         (within 6 months)    [  ] Immobilization > 24 hrs                                1    [  ] ICU/CCU stay > 24 hours                              1    [ x ] Age > 60                                                      1    IMPROVE VTE Score ____3_____    IMPROVE Score 0-1: Low Risk, No VTE prophylaxis required for most patients, encourage ambulation.   IMPROVE Score 2-3: At risk, pharmacologic VTE prophylaxis is indicated for most patients (in the absence of a contraindication)  IMPROVE Score > or = 4: High Risk, pharmacologic VTE prophylaxis is indicated for most patients (in the absence of a contraindication)    poc discussed with pt, her phi at bedside, team.

## 2019-06-07 NOTE — ED PROVIDER NOTE - PMH
Basal ganglia hemorrhage    CVA (cerebral infarction)    High cholesterol    Hyperlipemia    Seizure Basal ganglia hemorrhage    CVA (cerebral infarction)    Depression    High cholesterol    Hyperlipemia    Seizure

## 2019-06-07 NOTE — H&P ADULT - NSHPPHYSICALEXAM_GEN_ALL_CORE
PHYSICAL EXAM:    Daily Height in cm: 149.86 (07 Jun 2019 11:24)    Daily     ICU Vital Signs Last 24 Hrs  T(C): 36.7 (07 Jun 2019 18:14), Max: 36.7 (07 Jun 2019 11:24)  T(F): 98 (07 Jun 2019 18:14), Max: 98 (07 Jun 2019 11:24)  HR: 81 (07 Jun 2019 18:14) (73 - 82)  BP: 110/67 (07 Jun 2019 18:14) (110/67 - 134/68)  BP(mean): --  ABP: --  ABP(mean): --  RR: 16 (07 Jun 2019 18:14) (16 - 17)  SpO2: 100% (07 Jun 2019 18:14) (94% - 100%)      Constitutional: Well appearing  HEENT: Atraumatic, FADI, Normal, No congestion  Respiratory: Breath Sounds normal, no rhonchi/wheeze  Cardiovascular: N S1S2;   Gastrointestinal: Abdomen soft, non tender, Bowel Sounds present  Extremities: No edema, peripheral pulses present  Neurological: AAO x 1, right sided weakness and wasting, old  Skin: Non cellulitic, no rash, ulcers  Lymph Nodes: No lymphadenopathy noted  Back: No CVA tenderness   Musculoskeletal: non tender  Breasts: Deferred  Genitourinary: deferred  Rectal: Deferred

## 2019-06-08 DIAGNOSIS — F32.9 MAJOR DEPRESSIVE DISORDER, SINGLE EPISODE, UNSPECIFIED: ICD-10-CM

## 2019-06-08 LAB
APPEARANCE UR: CLEAR — SIGNIFICANT CHANGE UP
BACTERIA # UR AUTO: ABNORMAL
BILIRUB UR-MCNC: NEGATIVE — SIGNIFICANT CHANGE UP
COLOR SPEC: YELLOW — SIGNIFICANT CHANGE UP
DIFF PNL FLD: NEGATIVE — SIGNIFICANT CHANGE UP
EPI CELLS # UR: SIGNIFICANT CHANGE UP
GLUCOSE UR QL: NEGATIVE MG/DL — SIGNIFICANT CHANGE UP
HCV AB S/CO SERPL IA: 0.11 S/CO — SIGNIFICANT CHANGE UP (ref 0–0.99)
HCV AB SERPL-IMP: SIGNIFICANT CHANGE UP
KETONES UR-MCNC: NEGATIVE — SIGNIFICANT CHANGE UP
LEUKOCYTE ESTERASE UR-ACNC: ABNORMAL
LEVETIRACETAM SERPL-MCNC: 23.8 MCG/ML — SIGNIFICANT CHANGE UP (ref 12–46)
NITRITE UR-MCNC: NEGATIVE — SIGNIFICANT CHANGE UP
PH UR: 8 — SIGNIFICANT CHANGE UP (ref 5–8)
PROT UR-MCNC: NEGATIVE MG/DL — SIGNIFICANT CHANGE UP
RBC CASTS # UR COMP ASSIST: SIGNIFICANT CHANGE UP /HPF (ref 0–4)
SP GR SPEC: 1.01 — SIGNIFICANT CHANGE UP (ref 1.01–1.02)
UROBILINOGEN FLD QL: NEGATIVE MG/DL — SIGNIFICANT CHANGE UP
WBC UR QL: SIGNIFICANT CHANGE UP

## 2019-06-08 PROCEDURE — 99232 SBSQ HOSP IP/OBS MODERATE 35: CPT

## 2019-06-08 RX ORDER — LEVETIRACETAM 250 MG/1
1000 TABLET, FILM COATED ORAL AT BEDTIME
Refills: 0 | Status: DISCONTINUED | OUTPATIENT
Start: 2019-06-08 | End: 2019-06-11

## 2019-06-08 RX ORDER — ASPIRIN/CALCIUM CARB/MAGNESIUM 324 MG
81 TABLET ORAL DAILY
Refills: 0 | Status: DISCONTINUED | OUTPATIENT
Start: 2019-06-08 | End: 2019-06-10

## 2019-06-08 RX ADMIN — ENOXAPARIN SODIUM 40 MILLIGRAM(S): 100 INJECTION SUBCUTANEOUS at 11:58

## 2019-06-08 RX ADMIN — LEVETIRACETAM 1000 MILLIGRAM(S): 250 TABLET, FILM COATED ORAL at 22:33

## 2019-06-08 RX ADMIN — Medication 1 TABLET(S): at 11:58

## 2019-06-08 RX ADMIN — ARIPIPRAZOLE 5 MILLIGRAM(S): 15 TABLET ORAL at 11:58

## 2019-06-08 RX ADMIN — Medication 81 MILLIGRAM(S): at 17:49

## 2019-06-08 RX ADMIN — Medication 100 MILLIGRAM(S): at 22:33

## 2019-06-08 NOTE — SWALLOW BEDSIDE ASSESSMENT ADULT - SWALLOW EVAL: DIAGNOSIS
oral-pharyngeal swallow skills within functional limits for po intake: and for soft mechanical and thin liquids:  Acute dysphagia therapy is not warranted at this time for the Pt as reason for lack of intake is not biomechanical..

## 2019-06-08 NOTE — PROGRESS NOTE ADULT - ASSESSMENT
75 y/o F with PMHx of depression, seizure, HLD, basal ganglia hemorrhage, CVA with R sided residual weakness, chronic aphasia presenting to the ED for recent weight loss and weakness.  at bedside states pt has not been eating. Pt has reportedly had a ~10lb weight loss over the course of 1 month.  Previously weighing ~93 lbs and now 80 lbs.   also notes that pt fell this morning because she did not take her morning medications and because she refuses to eat or drink.  Pt was unable to get up on her own or ambulate after fall.  CT head was negative.     #Failure to thrive/ Weight loss/ Severe malnutrition:    Suspect related to psych/ depression.    Appreciate psych eval.    Wellbutrin/ Cymbalta stopped.  Abilify increased.    T/c remeron? for appetite stimulation.    Cont IVF for now.    Calorie count.    Nutrition eval.      #Depression:    As above.    Home meds being adjusted.      #Fall:    Suspect related to malnutrition/ weakness.    PT eval.      #Old CVA:  Statin held due to malnutrition.    Start ASA 81 mg daily.      #DVT PPX: lovenox 73 y/o F with PMHx of depression, seizure, HLD, basal ganglia hemorrhage, CVA with R sided residual weakness, chronic aphasia presenting to the ED for recent weight loss and weakness.  at bedside states pt has not been eating. Pt has reportedly had a ~10lb weight loss over the course of 1 month.  Previously weighing ~93 lbs and now 80 lbs.   also notes that pt fell this morning because she did not take her morning medications and because she refuses to eat or drink.  Pt was unable to get up on her own or ambulate after fall.  CT head was negative.     #Failure to thrive/ Weight loss/ Severe malnutrition:    Suspect related to psych/ depression.    Also hx of behavioural change in past with UTI-- check UA to r/o UTI.    Appreciate psych eval.    Wellbutrin/ Cymbalta stopped.  Abilify increased.    T/c remeron? for appetite stimulation.    Cont IVF for now.    Calorie count.    Nutrition eval.      #Depression:    As above.    Home meds being adjusted.      #Fall:    Suspect related to malnutrition/ weakness.    PT eval.      #Old CVA:  Statin held due to malnutrition.    Start ASA 81 mg daily.      #DVT PPX: lovenox

## 2019-06-08 NOTE — SWALLOW BEDSIDE ASSESSMENT ADULT - COMMENTS
pt, white female, age 74: PMHx of depression, seizure, HLD, basal ganglia hemorrhage in 2014, CVA  with R sided residual weakness, ambulation with assistance presenting to the ED for recent weight loss and weakness.  at bedside states pt has not been eating. Pt has reportedly had a ~10lb weight loss over the course of 1 month.  also notes that pt fell this morning because she did not take her morning medications and because she refuses to eat or drink. Pt was unable to get up on her own or ambulate after fall.  CT head was negative.   Service is asked to evaluate the Pt for po intake and for appropriate diet consistency.

## 2019-06-08 NOTE — DIETITIAN INITIAL EVALUATION ADULT. - PERTINENT MEDS FT
MEDICATIONS  (STANDING):  ARIPiprazole 5 milliGRAM(s) Oral daily  docusate sodium 100 milliGRAM(s) Oral three times a day  enoxaparin Injectable 40 milliGRAM(s) SubCutaneous daily  levETIRAcetam ER 1000 milliGRAM(s) Oral at bedtime  multivitamin/minerals 1 Tablet(s) Oral daily    MEDICATIONS  (PRN):  ondansetron Injectable 4 milliGRAM(s) IV Push every 6 hours PRN Nausea  senna 2 Tablet(s) Oral at bedtime PRN Constipation

## 2019-06-08 NOTE — SWALLOW BEDSIDE ASSESSMENT ADULT - NS SPL SWALLOW CLINIC TRIAL FT
Despite limited evaluation because of pt's inability to participate fully, Pt presents with oral-pharyngeal swallow skills within functional limits for soft mechanical and for thin liquids.    The source of her reduced intake is not biomechanical;  Rx Soft mechanical and thin liquids:   Encourage small intake over the course of the day:  Straw OK.  meds crushed in puree, or as tolerated.   Acute dysphagia therapy is not required and Service will not follow the Pt.   Please reconsult prn.

## 2019-06-08 NOTE — SWALLOW BEDSIDE ASSESSMENT ADULT - SLP GENERAL OBSERVATIONS
On encounter, pt lying in bed:  awake and easily agitato the point of crying and distraught; wanting her  to be here.  pt can be briefly focussed to procedure but is very skittish;  too distraught to be able to participate for long periods:  pt is thin and looks very frail:  hands appear to be markedly arthritic, but she is able to hold a cup..  She was persuaded to take po samples, but remained disconnected, focussed only to her need for her 's presence.

## 2019-06-08 NOTE — DIETITIAN INITIAL EVALUATION ADULT. - PERTINENT LABORATORY DATA
06-07 Na142 mmol/L Glu 88 mg/dL K+ 4.0 mmol/L Cr  0.66 mg/dL BUN 11 mg/dL Phos n/a   Alb 3.3 g/dL PAB n/a

## 2019-06-08 NOTE — PROGRESS NOTE ADULT - SUBJECTIVE AND OBJECTIVE BOX
73 y/o F with PMHx of depression, seizure, HLD, basal ganglia hemorrhage, CVA with R sided residual weakness, chronic aphasia presenting to the ED for recent weight loss and weakness.  at bedside states pt has not been eating. Pt has reportedly had a ~10lb weight loss over the course of 1 month.  Previously weighing ~93 lbs and now 80 lbs.   also notes that pt fell this morning because she did not take her morning medications and because she refuses to eat or drink.  Pt was unable to get up on her own or ambulate after fall.  CT head was negative.     :  PT seen.  D/w patient and .  Pt unclear why she's not eating.  Wants to go home.  Denies nausea.      Review of system- Rest of the review of system are negative except mentioned in HPI    OBJECTIVE:   T(C): 36.5 (19 @ 11:19), Max: 36.7 (19 @ 16:38)  HR: 86 (19 @ 11:19) (81 - 86)  BP: 102/53 (19 @ 11:19) (102/53 - 145/65)  RR: 16 (19 @ 11:19) (16 - 17)  SpO2: 99% (19 @ 11:19) (95% - 100%)  Wt(kg): --  Daily     Daily Weight in k.4 (2019 10:14)    PHYSICAL EXAM:  GENERAL: NAD, cachectic  NERVOUS SYSTEM:  muscle wasting.  right sided weakness.  aphasia.    HEAD:  Atraumatic, Normocephalic  EYES: EOMI, PERRLA, conjunctiva and sclera clear  HEENT: Moist mucous membranes  NECK: Supple, No JVD  CHEST/LUNG: Clear to auscultation bilaterally; No rales, no rhonchi, no wheezing, or rubs  HEART: Regular rate and rhythm; No murmurs, rubs, or gallops  ABDOMEN: Soft, Nontender, Nondistended; Bowel sounds present  GENITOURINARY- Voiding, no suprapubic tenderness  EXTREMITIES:  2+ Peripheral Pulses, No clubbing, cyanosis, or edema  MUSCULOSKELETAL:- No muscle tenderness, Muscle tone normal, No joint tenderness, no Joint swelling, Joint range of motion-normal  SKIN-no rash, no lesion    LABS:                        12.1   6.82  )-----------( 283      ( 2019 12:15 )             36.0     06-07    142  |  104  |  11  ----------------------------<  88  4.0   |  33<H>  |  0.66    Ca    9.1      2019 12:15  Mg     1.9     -    TPro  6.3  /  Alb  3.3  /  TBili  0.5  /  DBili  x   /  AST  18  /  ALT  21  /  AlkPhos  94  -    Current medications:  ARIPiprazole 5 milliGRAM(s) Oral daily  docusate sodium 100 milliGRAM(s) Oral three times a day  enoxaparin Injectable 40 milliGRAM(s) SubCutaneous daily  levETIRAcetam ER 1000 milliGRAM(s) Oral at bedtime  multivitamin/minerals 1 Tablet(s) Oral daily  ondansetron Injectable 4 milliGRAM(s) IV Push every 6 hours PRN  senna 2 Tablet(s) Oral at bedtime PRN

## 2019-06-08 NOTE — DIETITIAN INITIAL EVALUATION ADULT. - ORAL INTAKE PTA
Pt hasn't been eating well since March. Pt's  states she is refusing Ensure and will not drink. Pt has lost about 15lbs during this time (2 mo, 11% of Bw clinically sig wt loss). Pt appears to be eating less than 75% of NN over the past 2 months./poor

## 2019-06-08 NOTE — DIETITIAN INITIAL EVALUATION ADULT. - NS AS NUTRI DX NUTRIENT
Malnutrition/Meets criteria for severe protein-calorie malnutrition in the context social and eviormental circumstance

## 2019-06-08 NOTE — DIETITIAN INITIAL EVALUATION ADULT. - OTHER INFO
Pt seen for Calorie Count and weight loss. Pt Pt seen for Calorie Count and weight loss. Pt's pmhx noted for PMHx of depression, seizure, HLD, basal ganglia hemorrhage, CVA with R sided residual weakness, ambulation with assistance presenting to the ED for recent weight loss and weakness.  at bedside states pt has not been eating. Pt has reportedly had a ~10lb weight loss over the course of 1 month.  As described by  pt has been not eating since march. Pt states she does not like ensure and that it will make her sick if she drinks it. Pt agreeable to Protein supplement (Gelatien), will monitor. SLP recommended [facility] soft diet. pt was not able to provide much information mental status appeared altered. Pt with no noted n/v/d/c. Discussed with MD about starting appetite stimulant. MD states she will discuss with psych first. NFPE noted above. pt meets criteria for severe protein-calorie malnutrition in the context of social or environmental circumstance. Recommend Gelatein TID, Encourage PO intake, monitor caloire count. Will continue to monitor pt's nutritional status.

## 2019-06-08 NOTE — SWALLOW BEDSIDE ASSESSMENT ADULT - SWALLOW EVAL: RECOMMENDED FEEDING/EATING TECHNIQUES
alternate food with liquid/oral hygiene/check mouth frequently for oral residue/pocketing/crush medication (when feasible)/maintain upright posture during/after eating for 30 mins/allow for swallow between intakes/small sips/bites

## 2019-06-08 NOTE — CHART NOTE - NSCHARTNOTEFT_GEN_A_CORE
Upon Nutritional Assessment by the Registered Dietitian your patient was determined to meet criteria / has evidence of the following diagnosis/diagnoses:          [ ]  Mild Protein Calorie Malnutrition        [ ]  Moderate Protein Calorie Malnutrition        [x] Severe Protein Calorie Malnutrition        [ ] Unspecified Protein Calorie Malnutrition        [x] Underweight / BMI <19        [ ] Morbid Obesity / BMI > 40      Findings as based on:  •  Comprehensive nutrition assessment and consultation  •  Calorie counts (nutrient intake analysis)  •  Food acceptance and intake status from observations by staff  •  Follow up  •  Patient education  •  Intervention secondary to interdisciplinary rounds  •   concerns    · Nutrition Diagnostic Terminology #1: Nutrient  · Nutrient: Malnutrition; Meets criteria for severe protein-calorie malnutrition in the context social and eviormental circumstance  · Etiology: Poor Po intake 2/2 with AMS  · Signs/Symptoms: Severe muscle and fat wasting, clinically sig wt loss, <75% of NN over 1 month  · Nutrition Intervention: Meals and Snack; Medical Food Supplements; Nutrition - Related Medication Management  · Meals and Snacks: General/healthful diet; Mech soft  · Medical and Food Supplements: Commercial food; Gelatein TID  · Nutrition - Related Medication Management: Prescription medications; Appetite stimulant?  · Goal/Expected Outcome: Pt will complete >50% at each meal and drink supplement    Treatment:    The following diet has been recommended:  -Encourage Po intake  -Monitor weight  -Gelatein TID    PROVIDER Section:     By signing this assessment you are acknowledging and agree with the diagnosis/diagnoses assigned by the Registered Dietitian    Comments:

## 2019-06-08 NOTE — DIETITIAN INITIAL EVALUATION ADULT. - PHYSICAL APPEARANCE
other (specify)/underweight/NFPE showed severe muscle wasting in temple, clavicle, deltoid, interosseous, calves and quads.  Pt with moderat efat wasting in triceps and severe fat wasting in ribs.

## 2019-06-08 NOTE — SWALLOW BEDSIDE ASSESSMENT ADULT - ORAL PREPARATORY PHASE
Within functional limits inasmuch as pt is focused to eating routines, she ahs appropriate oral acceptance and conttainment./Within functional limits

## 2019-06-08 NOTE — SWALLOW BEDSIDE ASSESSMENT ADULT - PHARYNGEAL PHASE
Within functional limits Within functional limits/onset of pharyngeal swallow mildly latent.  observable laryngeal lift and no overt s/s aspiration.

## 2019-06-09 LAB
AMMONIA BLD-MCNC: 26 UMOL/L — SIGNIFICANT CHANGE UP (ref 11–32)
ANION GAP SERPL CALC-SCNC: 5 MMOL/L — SIGNIFICANT CHANGE UP (ref 5–17)
BUN SERPL-MCNC: 10 MG/DL — SIGNIFICANT CHANGE UP (ref 7–23)
CALCIUM SERPL-MCNC: 9.4 MG/DL — SIGNIFICANT CHANGE UP (ref 8.5–10.1)
CHLORIDE SERPL-SCNC: 104 MMOL/L — SIGNIFICANT CHANGE UP (ref 96–108)
CO2 SERPL-SCNC: 32 MMOL/L — HIGH (ref 22–31)
CREAT SERPL-MCNC: 0.66 MG/DL — SIGNIFICANT CHANGE UP (ref 0.5–1.3)
GLUCOSE SERPL-MCNC: 118 MG/DL — HIGH (ref 70–99)
HCT VFR BLD CALC: 41.4 % — SIGNIFICANT CHANGE UP (ref 34.5–45)
HGB BLD-MCNC: 13.7 G/DL — SIGNIFICANT CHANGE UP (ref 11.5–15.5)
MCHC RBC-ENTMCNC: 31.5 PG — SIGNIFICANT CHANGE UP (ref 27–34)
MCHC RBC-ENTMCNC: 33.1 GM/DL — SIGNIFICANT CHANGE UP (ref 32–36)
MCV RBC AUTO: 95.2 FL — SIGNIFICANT CHANGE UP (ref 80–100)
PLATELET # BLD AUTO: 325 K/UL — SIGNIFICANT CHANGE UP (ref 150–400)
POTASSIUM SERPL-MCNC: 4.4 MMOL/L — SIGNIFICANT CHANGE UP (ref 3.5–5.3)
POTASSIUM SERPL-SCNC: 4.4 MMOL/L — SIGNIFICANT CHANGE UP (ref 3.5–5.3)
RBC # BLD: 4.35 M/UL — SIGNIFICANT CHANGE UP (ref 3.8–5.2)
RBC # FLD: 12 % — SIGNIFICANT CHANGE UP (ref 10.3–14.5)
SODIUM SERPL-SCNC: 141 MMOL/L — SIGNIFICANT CHANGE UP (ref 135–145)
VIT B12 SERPL-MCNC: 177 PG/ML — LOW (ref 232–1245)
WBC # BLD: 9.77 K/UL — SIGNIFICANT CHANGE UP (ref 3.8–10.5)
WBC # FLD AUTO: 9.77 K/UL — SIGNIFICANT CHANGE UP (ref 3.8–10.5)

## 2019-06-09 PROCEDURE — 99232 SBSQ HOSP IP/OBS MODERATE 35: CPT

## 2019-06-09 RX ORDER — MIRTAZAPINE 45 MG/1
7.5 TABLET, ORALLY DISINTEGRATING ORAL AT BEDTIME
Refills: 0 | Status: DISCONTINUED | OUTPATIENT
Start: 2019-06-09 | End: 2019-06-10

## 2019-06-09 RX ADMIN — Medication 81 MILLIGRAM(S): at 12:43

## 2019-06-09 RX ADMIN — LEVETIRACETAM 1000 MILLIGRAM(S): 250 TABLET, FILM COATED ORAL at 21:03

## 2019-06-09 RX ADMIN — MIRTAZAPINE 7.5 MILLIGRAM(S): 45 TABLET, ORALLY DISINTEGRATING ORAL at 21:38

## 2019-06-09 RX ADMIN — Medication 100 MILLIGRAM(S): at 21:03

## 2019-06-09 RX ADMIN — ARIPIPRAZOLE 5 MILLIGRAM(S): 15 TABLET ORAL at 18:39

## 2019-06-09 RX ADMIN — Medication 1 TABLET(S): at 12:43

## 2019-06-09 RX ADMIN — ENOXAPARIN SODIUM 40 MILLIGRAM(S): 100 INJECTION SUBCUTANEOUS at 12:43

## 2019-06-09 RX ADMIN — Medication 100 MILLIGRAM(S): at 05:28

## 2019-06-09 NOTE — PROGRESS NOTE ADULT - ASSESSMENT
75 y/o F with PMHx of depression, seizure, HLD, basal ganglia hemorrhage, CVA with R sided residual weakness, chronic aphasia presenting to the ED for recent weight loss and weakness.  at bedside states pt has not been eating. Pt has reportedly had a ~10lb weight loss over the course of 1 month.  Previously weighing ~93 lbs and now 80 lbs.   also notes that pt fell this morning because she did not take her morning medications and because she refuses to eat or drink.  Pt was unable to get up on her own or ambulate after fall.  CT head was negative.     #Encephalopathy:    Unclear if all due to worsening depression/ malnutrition.    CT head negative on admission.    UA / CXR negative for infection.    Labs WNL.    TSH WNL.    Neuro eval to r/o other causes which could be contributing.    T/c EEG as pt does have hx of CVA in the past (seizure focus).      #Failure to thrive/ Weight loss/ Severe malnutrition:    Suspect related to psych/ depression.    Appreciate psych eval.    Wellbutrin/ Cymbalta stopped.  Abilify initially increased to 5 but now pt lethargic.      Remeron added.      Calorie count.    Nutrition eval.    No indication for IVF as labs WNL.      #Depression:    As above.    Home meds being adjusted.      #Fall:    Suspect related to malnutrition/ weakness.    PT eval.      #Old CVA:  Statin held due to malnutrition.    Start ASA 81 mg daily.      #DVT PPX: lovenox

## 2019-06-09 NOTE — PROGRESS NOTE ADULT - SUBJECTIVE AND OBJECTIVE BOX
73 y/o F with PMHx of depression, seizure, HLD, basal ganglia hemorrhage, CVA with R sided residual weakness, chronic aphasia presenting to the ED for recent weight loss and weakness.  at bedside states pt has not been eating. Pt has reportedly had a ~10lb weight loss over the course of 1 month.  Previously weighing ~93 lbs and now 80 lbs.   also notes that pt fell this morning because she did not take her morning medications and because she refuses to eat or drink.  Pt was unable to get up on her own or ambulate after fall.  CT head was negative.     :  PT seen.  D/w patient and .  Pt unclear why she's not eating.  Wants to go home.  Denies nausea.    :  Pt seen today.  Now lethargic.  Arousable but falls back to sleep.  D/w daughter and  @ bedside.  Ate / bagel today.      Review of system- Rest of the review of system are negative except mentioned in HPI    Vital Signs Last 24 Hrs  T(C): 36.4 (2019 11:32), Max: 36.7 (2019 04:51)  T(F): 97.5 (2019 11:32), Max: 98.1 (2019 04:51)  HR: 86 (2019 11:32) (79 - 86)  BP: 117/58 (2019 11:32) (103/64 - 125/63)  BP(mean): --  RR: 18 (2019 11:32) (18 - 18)  SpO2: 97% (2019 11:32) (95% - 100%)    PHYSICAL EXAM:  GENERAL: NAD, cachectic, lethargic today  NERVOUS SYSTEM:  muscle wasting.  right sided weakness.  aphasia.    HEAD:  Atraumatic, Normocephalic  EYES: EOMI, PERRLA, conjunctiva and sclera clear  HEENT: Moist mucous membranes  NECK: Supple, No JVD  CHEST/LUNG: Clear to auscultation bilaterally; No rales, no rhonchi, no wheezing, or rubs  HEART: Regular rate and rhythm; No murmurs, rubs, or gallops  ABDOMEN: Soft, Nontender, Nondistended; Bowel sounds present  GENITOURINARY- Voiding, no suprapubic tenderness  EXTREMITIES:  2+ Peripheral Pulses, No clubbing, cyanosis, or edema  MUSCULOSKELETAL:- No muscle tenderness, Muscle tone normal, No joint tenderness, no Joint swelling, Joint range of motion-normal  SKIN-no rash, no lesion    LABS:      141  |  104  |  10  ----------------------------<  118<H>  4.4   |  32<H>  |  0.66    Ca    9.4      2019 11:50                        13.7   9.77  )-----------( 325      ( 2019 11:50 )             41.4     Urinalysis Basic - ( 2019 19:30 )  Color: Yellow / Appearance: Clear / S.010 / pH: x  Gluc: x / Ketone: Negative  / Bili: Negative / Urobili: Negative mg/dL   Blood: x / Protein: Negative mg/dL / Nitrite: Negative   Leuk Esterase: Small / RBC: 0-2 /HPF / WBC 3-5   Sq Epi: x / Non Sq Epi: Occasional / Bacteria: Occasional    MEDICATIONS  (STANDING):  ARIPiprazole 5 milliGRAM(s) Oral daily  aspirin  chewable 81 milliGRAM(s) Oral daily  docusate sodium 100 milliGRAM(s) Oral three times a day  enoxaparin Injectable 40 milliGRAM(s) SubCutaneous daily  levETIRAcetam ER 1000 milliGRAM(s) Oral at bedtime  mirtazapine 7.5 milliGRAM(s) Oral at bedtime  multivitamin/minerals 1 Tablet(s) Oral daily    MEDICATIONS  (PRN):  ondansetron Injectable 4 milliGRAM(s) IV Push every 6 hours PRN Nausea  senna 2 Tablet(s) Oral at bedtime PRN Constipation

## 2019-06-09 NOTE — PROGRESS NOTE BEHAVIORAL HEALTH - NSBHCHARTREVIEWINVESTIGATE_PSY_A_CORE FT
Ventricular Rate 76 BPM    Atrial Rate 76 BPM    P-R Interval 148 ms    QRS Duration 58 ms    Q-T Interval 392 ms    QTC Calculation(Bezet) 441 ms    P Axis 66 degrees    R Axis 52 degrees    T Axis 69 degrees    Diagnosis Line Normal sinus rhythm  Normal ECG  When compared with ECG of 14-JAN-2018 14:14,  No significant change was found  Confirmed by JAMES SALEH MD ([id number]) on 6/7/2019 9:25:27 PM

## 2019-06-10 ENCOUNTER — APPOINTMENT (OUTPATIENT)
Dept: PHYSICAL MEDICINE AND REHAB | Facility: CLINIC | Age: 74
End: 2019-06-10

## 2019-06-10 LAB — GLUCOSE BLDC GLUCOMTR-MCNC: 134 MG/DL — HIGH (ref 70–99)

## 2019-06-10 PROCEDURE — 74176 CT ABD & PELVIS W/O CONTRAST: CPT | Mod: 26

## 2019-06-10 PROCEDURE — 71045 X-RAY EXAM CHEST 1 VIEW: CPT | Mod: 26

## 2019-06-10 PROCEDURE — 99221 1ST HOSP IP/OBS SF/LOW 40: CPT

## 2019-06-10 PROCEDURE — ZZZZZ: CPT

## 2019-06-10 PROCEDURE — 95816 EEG AWAKE AND DROWSY: CPT | Mod: 26

## 2019-06-10 PROCEDURE — 71045 X-RAY EXAM CHEST 1 VIEW: CPT | Mod: 26,77

## 2019-06-10 PROCEDURE — 99232 SBSQ HOSP IP/OBS MODERATE 35: CPT

## 2019-06-10 PROCEDURE — 99222 1ST HOSP IP/OBS MODERATE 55: CPT

## 2019-06-10 PROCEDURE — 70450 CT HEAD/BRAIN W/O DYE: CPT | Mod: 26

## 2019-06-10 PROCEDURE — 70496 CT ANGIOGRAPHY HEAD: CPT | Mod: 26

## 2019-06-10 PROCEDURE — 70498 CT ANGIOGRAPHY NECK: CPT | Mod: 26

## 2019-06-10 PROCEDURE — 70551 MRI BRAIN STEM W/O DYE: CPT | Mod: 26

## 2019-06-10 RX ORDER — PHENYLEPHRINE HYDROCHLORIDE 10 MG/ML
0.1 INJECTION INTRAVENOUS
Qty: 40 | Refills: 0 | Status: DISCONTINUED | OUTPATIENT
Start: 2019-06-10 | End: 2019-06-11

## 2019-06-10 RX ORDER — LEVETIRACETAM 250 MG/1
500 TABLET, FILM COATED ORAL EVERY 12 HOURS
Refills: 0 | Status: DISCONTINUED | OUTPATIENT
Start: 2019-06-10 | End: 2019-06-11

## 2019-06-10 RX ORDER — ARIPIPRAZOLE 15 MG/1
5 TABLET ORAL AT BEDTIME
Refills: 0 | Status: DISCONTINUED | OUTPATIENT
Start: 2019-06-11 | End: 2019-06-11

## 2019-06-10 RX ORDER — SODIUM CHLORIDE 9 MG/ML
1000 INJECTION INTRAMUSCULAR; INTRAVENOUS; SUBCUTANEOUS
Refills: 0 | Status: DISCONTINUED | OUTPATIENT
Start: 2019-06-10 | End: 2019-06-11

## 2019-06-10 RX ORDER — SODIUM CHLORIDE 9 MG/ML
1000 INJECTION INTRAMUSCULAR; INTRAVENOUS; SUBCUTANEOUS ONCE
Refills: 0 | Status: COMPLETED | OUTPATIENT
Start: 2019-06-10 | End: 2019-06-10

## 2019-06-10 RX ORDER — NICARDIPINE HYDROCHLORIDE 30 MG/1
5 CAPSULE, EXTENDED RELEASE ORAL
Qty: 40 | Refills: 0 | Status: DISCONTINUED | OUTPATIENT
Start: 2019-06-10 | End: 2019-06-11

## 2019-06-10 RX ORDER — PREGABALIN 225 MG/1
1000 CAPSULE ORAL DAILY
Refills: 0 | Status: DISCONTINUED | OUTPATIENT
Start: 2019-06-10 | End: 2019-06-11

## 2019-06-10 RX ORDER — ARIPIPRAZOLE 15 MG/1
5 TABLET ORAL AT BEDTIME
Refills: 0 | Status: DISCONTINUED | OUTPATIENT
Start: 2019-06-10 | End: 2019-06-10

## 2019-06-10 RX ADMIN — Medication 1 TABLET(S): at 12:33

## 2019-06-10 RX ADMIN — Medication 81 MILLIGRAM(S): at 12:32

## 2019-06-10 RX ADMIN — ENOXAPARIN SODIUM 40 MILLIGRAM(S): 100 INJECTION SUBCUTANEOUS at 12:32

## 2019-06-10 RX ADMIN — PHENYLEPHRINE HYDROCHLORIDE 1.37 MICROGRAM(S)/KG/MIN: 10 INJECTION INTRAVENOUS at 23:53

## 2019-06-10 RX ADMIN — SODIUM CHLORIDE 1000 MILLILITER(S): 9 INJECTION INTRAMUSCULAR; INTRAVENOUS; SUBCUTANEOUS at 23:53

## 2019-06-10 RX ADMIN — ONDANSETRON 4 MILLIGRAM(S): 8 TABLET, FILM COATED ORAL at 00:30

## 2019-06-10 RX ADMIN — SODIUM CHLORIDE 60 MILLILITER(S): 9 INJECTION INTRAMUSCULAR; INTRAVENOUS; SUBCUTANEOUS at 09:56

## 2019-06-10 RX ADMIN — PREGABALIN 1000 MICROGRAM(S): 225 CAPSULE ORAL at 12:36

## 2019-06-10 NOTE — PROGRESS NOTE BEHAVIORAL HEALTH - NSBHCHARTREVIEWVS_PSY_A_CORE FT
Vital Signs Last 24 Hrs  T(C): 36.7 (10 Andrés 2019 04:50), Max: 36.8 (10 Andrés 2019 00:38)  T(F): 98 (10 Andrés 2019 04:50), Max: 98.3 (10 Andrés 2019 00:38)  HR: 79 (10 Andrés 2019 04:50) (79 - 91)  BP: 147/79 (10 Andrés 2019 04:50) (117/58 - 158/71)  BP(mean): --  RR: 16 (10 Andrés 2019 04:50) (16 - 18)  SpO2: 95% (10 Andrés 2019 04:50) (94% - 97%)
ICU Vital Signs Last 24 Hrs  T(C): 36.5 (08 Jun 2019 11:19), Max: 36.7 (07 Jun 2019 16:38)  T(F): 97.7 (08 Jun 2019 11:19), Max: 98 (07 Jun 2019 16:38)  HR: 86 (08 Jun 2019 11:19) (81 - 86)  BP: 102/53 (08 Jun 2019 11:19) (102/53 - 145/65)  BP(mean): --  ABP: --  ABP(mean): --  RR: 16 (08 Jun 2019 11:19) (16 - 17)  SpO2: 99% (08 Jun 2019 11:19) (95% - 100%)
ICU Vital Signs Last 24 Hrs  T(C): 36.4 (09 Jun 2019 11:32), Max: 36.7 (09 Jun 2019 04:51)  T(F): 97.5 (09 Jun 2019 11:32), Max: 98.1 (09 Jun 2019 04:51)  HR: 86 (09 Jun 2019 11:32) (79 - 86)  BP: 117/58 (09 Jun 2019 11:32) (103/64 - 125/63)  BP(mean): --  ABP: --  ABP(mean): --  RR: 18 (09 Jun 2019 11:32) (18 - 18)  SpO2: 97% (09 Jun 2019 11:32) (95% - 100%)

## 2019-06-10 NOTE — PHYSICAL THERAPY INITIAL EVALUATION ADULT - ADDITIONAL COMMENTS
Pt states was able to get in and out of bed, get dressed; shower on her own. was using a walker . has a cane and w/c available..  bedroom and bathroom are on first level of house.  Unable to get clear indication of whether there are ROSHAN. Info obtained from eval 6/2017.  has expressive aphasia.

## 2019-06-10 NOTE — CONSULT NOTE ADULT - SUBJECTIVE AND OBJECTIVE BOX
HPI:  75 y/o F with PMHx of depression, seizure, HLD, basal ganglia hemorrhage, CVA with R sided residual weakness, ambulation with assistance presenting to the ED for recent weight loss and weakness.  at bedside states pt has not been eating. Pt has reportedly had a ~10lb weight loss over the course of 1 month.  also notes that pt fell this morning because she did not take her morning medications and because she refuses to eat or drink. Pt was unable to get up on her own or ambulate after fall. Called by Medicine for new MRI Brain findings of ICH with IVH consistent on HCT performed today. Patient seen and examined on 5E. Per family at bedside patient has been more lethargic today. HCT from 6/7 negative for hemorrhage. GCS 7 V:2 M:4 E:1      PAST MEDICAL & SURGICAL HISTORY:  Depression  Seizure  CVA (cerebral infarction)  Basal ganglia hemorrhage  Hyperlipemia  High cholesterol      FAMILY HISTORY:  No pertinent family history in first degree relatives        Social Hx:  Nonsmoker, no drug or alcohol use    MEDICATIONS  (STANDING):  cyanocobalamin Injectable 1000 MICROGram(s) IntraMuscular daily  docusate sodium 100 milliGRAM(s) Oral three times a day  levETIRAcetam ER 1000 milliGRAM(s) Oral at bedtime  multivitamin/minerals 1 Tablet(s) Oral daily  sodium chloride 0.9%. 1000 milliLiter(s) (60 mL/Hr) IV Continuous <Continuous>       Allergies  No Known Allergies  Intolerances        ROS: Pertinent positives in HPI, all other ROS were reviewed and are negative.      Vital Signs Last 24 Hrs  T(C): 36.9 (10 Andrés 2019 20:12), Max: 37 (10 Andrés 2019 12:34)  T(F): 98.5 (10 Andrés 2019 20:12), Max: 98.6 (10 Andrés 2019 12:34)  HR: 96 (10 Andrés 2019 16:42) (75 - 96)  BP: 160/85 (10 Andrés 2019 20:12) (104/59 - 160/85)  BP(mean): --  RR: 18 (10 Andrés 2019 20:12) (16 - 18)  SpO2: 98% (10 Andrés 2019 20:12) (93% - 98%)      PHYSICAL EXAM:  Constitutional: lethargic, groans to sternal rub, snoring respirations  HEENT: R pupil fixed, dilated, L pupil 5mm RTL  Neck: Supple  Respiratory: Breath sounds are clear bilaterally  Cardiovascular: S1 and S2, regular rhythm  Gastrointestinal: soft, nontender  Extremities:  no edema  Vascular: Caritid Bruit - no  Musculoskeletal: R sided rigidity (chronic)  Skin: No rashes    Neurological exam:  HF: lethargic, unable to open eyes to command  CN: R pupil fixed, dilated, L pupil 5mm RTL, blinks to threat on L,   Motor/Sens: Flex to noxious b/l UE, RLE; minimal movement LLE to noxious  Reflexes: +corneals  Coord:  unable to assess  Gait/BalanceCannot test            Labs:                        13.7   9.77  )-----------( 325      ( 09 Jun 2019 11:50 )             41.4     06-09    141  |  104  |  10  ----------------------------<  118<H>  4.4   |  32<H>  |  0.66    Ca    9.4      09 Jun 2019 11:50              Radiology report:   CT Head No Cont (06.10.19 @ 19:10)    IMPRESSION:  Acute extensive hemorrhage which is subarachnoid and intraventricular   involving predominantly the posterior fossa and to lesser extent the   occipital lobes. The hematoma causes significant mass effect on the left   aspect of the midbrain. There is also component of intraparenchymal   hemorrhage within the left brachium pontis. Findings are also seen on the   MR brain done at the same time.

## 2019-06-10 NOTE — PROGRESS NOTE BEHAVIORAL HEALTH - NSBHCONSULTRECOMMENDOTHER_PSY_A_CORE FT
avoid benzo which can worsen confusion

## 2019-06-10 NOTE — PROGRESS NOTE BEHAVIORAL HEALTH - NSBHCHARTREVIEWLAB_PSY_A_CORE FT
13.7   9.77  )-----------( 325      ( 09 Jun 2019 11:50 )             41.4   06-09    141  |  104  |  10  ----------------------------<  118<H>  4.4   |  32<H>  |  0.66    Ca    9.4      09 Jun 2019 11:50
Thyroid Stimulating Hormone, Serum (06.07.19 @ 12:15)    Thyroid Stimulating Hormone, Serum: 0.90 uU/mL  Comprehensive Metabolic Panel (06.07.19 @ 12:15)    Sodium, Serum: 142 mmol/L    Potassium, Serum: 4.0 mmol/L    Chloride, Serum: 104 mmol/L    Carbon Dioxide, Serum: 33 mmol/L    Anion Gap, Serum: 5 mmol/L    Blood Urea Nitrogen, Serum: 11 mg/dL    Creatinine, Serum: 0.66 mg/dL    Glucose, Serum: 88 mg/dL    Calcium, Total Serum: 9.1 mg/dL    Protein Total, Serum: 6.3 gm/dL    Albumin, Serum: 3.3 g/dL    Bilirubin Total, Serum: 0.5 mg/dL    Alkaline Phosphatase, Serum: 94 U/L    Aspartate Aminotransferase (AST/SGOT): 18 U/L    Alanine Aminotransferase (ALT/SGPT): 21 U/L

## 2019-06-10 NOTE — CHART NOTE - NSCHARTNOTEFT_GEN_A_CORE
discussed MRI findings w/ Radiologist.  Consulted Neurosurgery team on call who will see patient.  Also received call about repeat CT scan from radiologist who will call NSG team to review.

## 2019-06-10 NOTE — CONSULT NOTE ADULT - SUBJECTIVE AND OBJECTIVE BOX
Patient is a 74y old  Female who presents with a chief complaint of weakness, weight loss, confusion and AMS      HPI:  75 y/o F with PMHx of depression, seizure, HLD, basal ganglia hemorrhage, CVA with R sided residual weakness, ambulation with assistance presenting to the ED for recent weight loss and weakness.  at bedside states pt has not been eating. Pt has reportedly had a ~10lb weight loss over the course of 1 month.  also notes that pt fell on day of admission  morning because she did not take her morning medications and because she refuses to eat or drink. Pt was unable to get up on her own or ambulate after fall. CT head was negative.  Pt seen by Psych in ER. Pt responds  minimally follows simple commands but only at times.     PAST MEDICAL & SURGICAL HISTORY:  Depression  Seizure  CVA (cerebral infarction)  Basal ganglia hemorrhage  Hyperlipemia  High cholesterol      FAMILY HISTORY:  No pertinent family history in first degree relatives      Social Hx:  Nonsmoker, no drug or alcohol use    MEDICATIONS  (STANDING):  aspirin  chewable 81 milliGRAM(s) Oral daily  cyanocobalamin Injectable 1000 MICROGram(s) IntraMuscular daily  docusate sodium 100 milliGRAM(s) Oral three times a day  enoxaparin Injectable 40 milliGRAM(s) SubCutaneous daily  levETIRAcetam ER 1000 milliGRAM(s) Oral at bedtime  multivitamin/minerals 1 Tablet(s) Oral daily  sodium chloride 0.9%. 1000 milliLiter(s) (60 mL/Hr) IV Continuous <Continuous>       Allergies    No Known Allergies      ROS: Pertinent positives in HPI, all other ROS were reviewed and are negative.      Vital Signs Last 24 Hrs  T(C): 36.7 (10 Andrés 2019 04:50), Max: 36.8 (10 Andrés 2019 00:38)  T(F): 98 (10 Andrés 2019 04:50), Max: 98.3 (10 Andrés 2019 00:38)  HR: 79 (10 Andrés 2019 04:50) (79 - 91)  BP: 147/79 (10 Andrés 2019 04:50) (117/58 - 158/71)  BP(mean): --  RR: 16 (10 Andrés 2019 04:50) (16 - 18)  SpO2: 95% (10 Andrés 2019 04:50) (94% - 97%)        Constitutional: Lethargic, opens eyes to calling name, follows simple commands, Cachetic looking   HEENT: PERRLA, EOMI,   Neck: Supple.  Respiratory: Breath sounds are clear bilaterally  Cardiovascular: S1 and S2, regular rhythm  Gastrointestinal: soft, nontender  Extremities:  no edema  Vascular:  Carotid Bruit - no  Musculoskeletal: no joint swelling/tenderness  Skin: No rashes    Neurological exam:  HF: Lethargic, arousable, follows simple commands at times.   CN: JENNIFER, EOMI, VFF to threat, Rt facial, gag not tested  .    Motor:  Spatic Rt hemiplegia, Left side moves randomly but not to commands,   Sens: Resonds to stimuli on left side   Reflexes:  Brisk Rt side +1 on left side , Up going on both sides  Coord:  Not tested  Gait/Balance: Cannot test    NIHSS: can not assess  due to MS      Labs:   06-09    141  |  104  |  10  ----------------------------<  118<H>  4.4   |  32<H>  |  0.66    Ca    9.4      09 Jun 2019 11:50                          13.7   9.77  )-----------( 325      ( 09 Jun 2019 11:50 )             41.4     Levetiracetam Level, Serum: 23.8: -------------------ADDITIONAL INFORMATION-------------------    Vitamin B12, Serum (06.09.19 @ 11:50)    Vitamin B12, Serum: 177 pg/mL      Radiology:  - CT Head:    < from: CT Head No Cont (06.07.19 @ 12:35) >  IMPRESSION:       No parenchymal contusion, hemorrhage or extra-axial collection.    No CT evidence of an acute territorial infarction.    Chronic small vessel ischemic changes.

## 2019-06-10 NOTE — PROGRESS NOTE BEHAVIORAL HEALTH - RISK ASSESSMENT
min risk of self harm, no h/o self harm or psych admissions.  In supportive relationship with [facility] with [facility]/7 aid.
LOW  risk of self harm, no h/o self harm or psych admissions, not currently suicidal, no insomnia and anxiety,   Has place to live, In supportive relationship with [facility] with [facility]7 aid.
min risk of self harm, no h/o self harm or psych admissions.  In supportive relationship with [facility] with [facility] aid.

## 2019-06-10 NOTE — PROGRESS NOTE ADULT - SUBJECTIVE AND OBJECTIVE BOX
Neurosurg eval Addendum:    CTA demonstrates progression of hemorrhage with IVH extension (not officially read).  Pt with devastating / grave neurological status and CT findings regarding midbrain hemorrhagic stroke.  Pt with code called and upgraded to ICU.  d/w family about no meaningful recovery with such a devastating ICH.  Family HCP () still requesting full supportive measures.  Daughter understands gravity of situation at this time.     GCS: E1VTM2 (flexion w/d LE's)    ICH Score: 5  Intracerebral Hemorrhage (ICH) Score  100% mortality.  INPUTS:  Keshawn Coma Score<—> 2 = 3-4  Age =80 —> 0 = No  ICH volume =30mL —> 1 = Yes  Intraventricular hemorrhage —> 1 = Yes  Infratentorial origin of hemorrhage —> 1 = Yes      NIHSS: 28  INPUTS:  1A: Level of consciousness —> 3 = Postures or unresponsive  1B: Ask month and age —> 2 = 0 questions right   1C: 'Blink eyes' & 'squeeze hands' —> 2 = Performs 0 tasks  2: Horizontal extraocular movements —> 2 = Forced gaze palsy: cannot be overcome  3: Visual fields —> 0 = No visual loss  4: Facial palsy —> 0 = Normal symmetry  5A: Left arm motor drift —> 4 = No movement  5B: Right arm motor drift —> 4 = No movement  6A: Left leg motor drift —> 3 = No effort against gravity  6B: Right leg motor drift —> 3 = No effort against gravity  7: Limb Ataxia —> 0 = Paralyzed  8: Sensation —> 2 = Coma/unresponsive  9: Language/aphasia —> 3 = Coma/unresponsive  10: Dysarthria —> 0 = Intubated/unable to test  11: Extinction/inattention —> 0 = No abnormality    CTA pending: expansion of ICH with IVH.    PE:  Pt Fixed, NR pupils b/l 3mm.  No corneals b/l  No gag, no cough. Not breathing over vent  Motor: triple flexion both LE's R > L  UE's no motor    A/P: 73 y/o F with PMHx of depression, seizure, HLD, basal ganglia hemorrhage, CVA with R sided residual weakness, ambulation with assistance presenting to the ED for recent weight loss and weakness. HCT from 6/7 negative for hemorrhage.  Neurosurgery called for new MRI Brain findings of new extensive SAH, ICH in midbrain location causing extreme central compression/herniation with hydrocephalus / devastating ICH.    Plan:  - Grave neurological status and imaging findings not c/w meaningful recovery  - CTA results pending - but no intervention due to dismal neuro status  - CT results d/w  HCP and daughter at bedside - Grave prognosis with no meaningful recovery, vent depenedent state with possibility of full neuro axis and cardiovascular collapse.   still wants supportive measures at this time.  Will consider DNR if pt continues to code throughout the night.  - HOB 30 degrees  - Keep SBP < 160  - Neuro checks q 1 hour, no sedation  - No neurosurg intervention , No antiszr meds required.  - Discussed with intensivist managing in ICU at this time  - communicated with Dr. George accordingly    Dr. George reviewed HCT imaging and is in agreement with the plan Neurosurg eval Addendum:    73 y/o F with PMHx of depression, seizure, HLD, basal ganglia hemorrhage, CVA with R sided residual weakness. HCT from 6/7 negative for hemorrhage - seen by neurology 6/10 and MRI recommended.  Neurosurgery called for new MRI Brain findings of extensive SAH, Suboccipital ICH in midbrain location, confirmed by immediate CTH.  Intracerebral hemorrhage causing extreme central compression/herniation with hydrocephalus.  Recommendation for immediate Crit Care management / ICU transfer along with CTA for devastating intracranial bleed.  Grave prognosis for patient communicated with family.  Code event - pt managed in ICU.    CTA demonstrates progression of hemorrhage with IVH extension (not officially read).  Pt with devastating / grave neurological status and CT findings regarding midbrain hemorrhagic stroke.  D/w family about no meaningful recovery with such a devastating ICH.  Family HCP () still requesting full supportive measures.  Daughter understands gravity of situation at this time.     GCS: E1VTM2 (flexion w/d LE's)    ICH Score: 5  Intracerebral Hemorrhage (ICH) Score  100% mortality.  INPUTS:  Keshawn Coma Score<—> 2 = 3-4  Age =80 —> 0 = No  ICH volume =30mL —> 1 = Yes  Intraventricular hemorrhage —> 1 = Yes  Infratentorial origin of hemorrhage —> 1 = Yes      NIHSS: 28  INPUTS:  1A: Level of consciousness —> 3 = Postures or unresponsive  1B: Ask month and age —> 2 = 0 questions right   1C: 'Blink eyes' & 'squeeze hands' —> 2 = Performs 0 tasks  2: Horizontal extraocular movements —> 2 = Forced gaze palsy: cannot be overcome  3: Visual fields —> 0 = No visual loss  4: Facial palsy —> 0 = Normal symmetry  5A: Left arm motor drift —> 4 = No movement  5B: Right arm motor drift —> 4 = No movement  6A: Left leg motor drift —> 3 = No effort against gravity  6B: Right leg motor drift —> 3 = No effort against gravity  7: Limb Ataxia —> 0 = Paralyzed  8: Sensation —> 2 = Coma/unresponsive  9: Language/aphasia —> 3 = Coma/unresponsive  10: Dysarthria —> 0 = Intubated/unable to test  11: Extinction/inattention —> 0 = No abnormality    CTA pending: expansion of ICH with IVH.    PE:  Pt Fixed, NR pupils b/l 3mm.  No corneals b/l  No gag, no cough. Not breathing over vent  Motor: triple flexion both LE's R > L  UE's no motor    A/P: 73 y/o F with PMHx of depression, seizure, HLD, basal ganglia hemorrhage, CVA with R sided residual weakness. HCT from 6/7 negative for hemorrhage - seen by neurology 6/10 and MRI recommended.  Neurosurgery called for new MRI Brain findings of extensive SAH, Suboccipital ICH in midbrain location, confirmed by immediate CTH.  Intracerebral hemorrhage causing extreme central compression/herniation with hydrocephalus.  Recommendation for immediate Crit Care management / ICU transfer along with CTA for devastating intracranial bleed.  Grave prognosis for patient communicated with family.  Code event - pt managed in ICU.    Plan:  - Grave neurological status and imaging findings not c/w meaningful recovery  - CTA results pending - but no intervention due to dismal neuro status  - CT results d/w  HCP and daughter at bedside - Grave prognosis with no meaningful recovery, vent depenedent state with possibility of full neuro axis and cardiovascular collapse.   still wants supportive measures at this time.  Will consider DNR if pt continues to code throughout the night.  - HOB 30 degrees  - Keep SBP < 160  - Neuro checks q 1 hour, no sedation  - No neurosurg intervention , No antiszr meds required.  - Discussed with intensivist managing in ICU at this time  - communicated with Dr. Geroge accordingly    Dr. George reviewed HCT imaging and is in agreement with the plan Neurosurg eval Addendum:    73 y/o F with PMHx of depression, seizure, HLD, basal ganglia hemorrhage, CVA with R sided residual weakness. HCT from 6/7 negative for hemorrhage - seen by neurology 6/10 and MRI recommended.  Neurosurgery called for new MRI Brain findings of extensive SAH, Suboccipital ICH in midbrain location, confirmed by immediate CTH.  Intracerebral hemorrhage causing extreme central compression/herniation with hydrocephalus.  Recommendation for immediate Crit Care management / ICU transfer along with CTA for devastating intracranial bleed.  Grave prognosis for patient communicated with family.  Code event - pt managed in ICU.    CTA demonstrates progression of hemorrhage with IVH extension (not officially read).  Pt with devastating / grave neurological status and CT findings regarding midbrain hemorrhagic stroke.  D/w family about no meaningful recovery with such a devastating ICH.  Family HCP () still requesting full supportive measures.  Daughter understands gravity of situation at this time.     GCS: E1VTM2 (flexion w/d LE's)    ICH Score: 5  Intracerebral Hemorrhage (ICH) Score  100% mortality.  INPUTS:  Keshawn Coma Score<—> 2 = 3-4  Age =80 —> 0 = No  ICH volume =30mL —> 1 = Yes  Intraventricular hemorrhage —> 1 = Yes  Infratentorial origin of hemorrhage —> 1 = Yes      NIHSS: 28  INPUTS:  1A: Level of consciousness —> 3 = Postures or unresponsive  1B: Ask month and age —> 2 = 0 questions right   1C: 'Blink eyes' & 'squeeze hands' —> 2 = Performs 0 tasks  2: Horizontal extraocular movements —> 2 = Forced gaze palsy: cannot be overcome  3: Visual fields —> 0 = No visual loss  4: Facial palsy —> 0 = Normal symmetry  5A: Left arm motor drift —> 4 = No movement  5B: Right arm motor drift —> 4 = No movement  6A: Left leg motor drift —> 3 = No effort against gravity  6B: Right leg motor drift —> 3 = No effort against gravity  7: Limb Ataxia —> 0 = Paralyzed  8: Sensation —> 2 = Coma/unresponsive  9: Language/aphasia —> 3 = Coma/unresponsive  10: Dysarthria —> 0 = Intubated/unable to test  11: Extinction/inattention —> 0 = No abnormality    CTA pending: expansion of ICH with IVH.    PE:  Pt Fixed, NR pupils b/l 3mm.  No corneals b/l  No gag, no cough. Not breathing over vent  Motor: triple flexion both LE's R > L  UE's no motor    A/P: 73 y/o F with PMHx of depression, seizure, HLD, basal ganglia hemorrhage, CVA with R sided residual weakness. HCT from 6/7 negative for hemorrhage - seen by neurology 6/10 and MRI recommended.  Neurosurgery called for new MRI Brain findings of extensive SAH, Suboccipital ICH in midbrain location, confirmed by immediate CTH.  Intracerebral hemorrhage causing extreme central compression/herniation with hydrocephalus.  Recommendation for immediate Crit Care management / ICU transfer along with CTA for devastating intracranial bleed.  Grave prognosis for patient communicated with family.  Code event - pt managed in ICU.    Plan:  - Grave neurological status and imaging findings not c/w meaningful recovery  - CTA results pending - but no intervention due to dismal neuro status  - CT results d/w  HCP and daughter at bedside - Grave prognosis with no meaningful recovery, vent depenedent state with possibility of full neuro axis and cardiovascular collapse.   still wants supportive measures at this time.  Will consider DNR if pt continues to code throughout the night.  - HOB 30 degrees  - Keep SBP < 160  - Neuro checks q 1 hour, no sedation  - No neurosurg intervention , No antiszr meds required.  - Discussed with intensivist managing in ICU at this time  - communicated with Dr. George accordingly      Additional Critical Care > 62 minutes in evaluating patient, medical record, imaging studies and d/w family staff grave prognosis while implementing neuro protective measures to avoid neuro axis collapse / failure / cardiopulmonary arrest.    Dr. George reviewed HCT imaging and is in agreement with the plan

## 2019-06-10 NOTE — PROGRESS NOTE ADULT - SUBJECTIVE AND OBJECTIVE BOX
cc: weakness, wt loss, confusion  hpi: 74y female w/ pmh depression, seizure disorder, ICH w/ rt side residual weakness, chronic aphasia p/w weakness, ams, wt loss.    - chart reviewed.   was upset this morning b/c pt very lethargic- received remeron 7.5mg last night and as per  she's had adverse effect to remeron in past and cannot take.  States his wife was 93lbs 2 months ago and when they followed up outpt recently she was 80lbs b/c not eating at home.  She was advised to drink ensure supplements.   Seen by Neuro this morning and  would like to pursue further workup to r/o another CVA.      ros- limited due to mentation; as per hpi/ above    Vital Signs Last 24 Hrs  T(C): 37 (10 Andrés 2019 12:34), Max: 37 (10 Andrés 2019 12:34)  T(F): 98.6 (10 Andrés 2019 12:34), Max: 98.6 (10 Andrés 2019 12:34)  HR: 75 (10 Andrés 2019 12:34) (75 - 91)  BP: 104/59 (10 Andrés 2019 12:34) (104/59 - 158/71)  BP(mean): --  RR: 17 (10 Andrés 2019 12:34) (16 - 18)  SpO2: 96% (10 Andrés 2019 12:34) (94% - 96%)      PHYSICAL EXAM:  General: chronic ill appearing, cachectic, lethargic  Neuro:   HEENT: NCAT  Respiratory: CTA b/l, no w/r/r  Cardiovascular: S1 and S2, RRR, no m/g/r  Gastrointestinal: +BS, soft, NTND  Extremities: No edema  Vascular: 2+ peripheral pulses        LABS: All Labs Reviewed:                        13.7   9.77  )-----------( 325      ( 09 Jun 2019 11:50 )             41.4     06-09    141  |  104  |  10  ----------------------------<  118<H>  4.4   |  32<H>  |  0.66    Ca    9.4      09 Jun 2019 11:50        < from: EEG Awake or Drowsy (06.10.19 @ 12:10) >    Impression:    1. Generalized bilateral slow activity noted.  2. Right more than left side frontotemporal slowing suggestive of   underlying structural pathology.  3. Intermittent generalized sharp wave activity suggestive of   epileptogenic activity suggestive of underlying epileptic focus in   bilateral temporal regions. Continued correlation recommended.      < end of copied text >    MEDICATIONS  (STANDING):  aspirin  chewable 81 milliGRAM(s) Oral daily  cyanocobalamin Injectable 1000 MICROGram(s) IntraMuscular daily  docusate sodium 100 milliGRAM(s) Oral three times a day  enoxaparin Injectable 40 milliGRAM(s) SubCutaneous daily  levETIRAcetam ER 1000 milliGRAM(s) Oral at bedtime  multivitamin/minerals 1 Tablet(s) Oral daily  sodium chloride 0.9%. 1000 milliLiter(s) (60 mL/Hr) IV Continuous <Continuous>    MEDICATIONS  (PRN):  ondansetron Injectable 4 milliGRAM(s) IV Push every 6 hours PRN Nausea  senna 2 Tablet(s) Oral at bedtime PRN Constipation      Assessment and Plan:   74y female w/     1. acute encephalopathy multifactorial  - B12 deficiency- start injections  - CT head negative, no fever/normal wbc/UA/CXR neg for infection, TSH normal  - MRI ordered to r/o new CVA/lesion  - EEG- as above , suggestive of epileptic focus, will discuss w/ Neuro  - continue keppra- dose increased when admitted ; keppra level normal  - also in setting of underlying depression/meds--> cymbalta and wellbutrin stopped, new med remeron d/c and will continue abilify.    - Psych and Neuro f/u appreciated     2. seizure disorder  - as above    3. hx CVA  - as above  - continue aspirin, will discuss statin with     4.  failure to thrive/wt loss/severe protein calorie malnutriton  - possibly due to depression as  states patient chooses to not eat much for past 2 months  but  requesting more workup-  will image to r/o occult malignancy although low suspicion  - ivf today as per request;  if no improvement in intake,  wants to discuss nutrition options   - Nutrition eval/calorie count     5. dvt px   lovenox cc: weakness, wt loss, confusion  hpi: 74y female w/ pmh depression, seizure disorder, ICH w/ rt side residual weakness, chronic aphasia p/w weakness, ams, wt loss.    - chart reviewed.   was upset this morning b/c pt very lethargic- received remeron 7.5mg last night and as per  she's had adverse effect to remeron in past and cannot take.  States his wife was 93lbs 2 months ago and when they followed up outpt recently she was 80lbs b/c not eating at home.  She was advised to drink ensure supplements.   Seen by Neuro this morning and  would like to pursue further workup to r/o another CVA.      ros- limited due to mentation; as per hpi/ above    Vital Signs Last 24 Hrs  T(C): 37 (10 Andrés 2019 12:34), Max: 37 (10 Andrés 2019 12:34)  T(F): 98.6 (10 Andrés 2019 12:34), Max: 98.6 (10 Andrés 2019 12:34)  HR: 75 (10 Andrés 2019 12:34) (75 - 91)  BP: 104/59 (10 Andrés 2019 12:34) (104/59 - 158/71)  BP(mean): --  RR: 17 (10 Andrés 2019 12:34) (16 - 18)  SpO2: 96% (10 Andrés 2019 12:34) (94% - 96%)      PHYSICAL EXAM:  General: chronic ill appearing, cachectic, lethargic  Neuro:   HEENT: NCAT  Respiratory: CTA b/l, no w/r/r  Cardiovascular: S1 and S2, RRR, no m/g/r  Gastrointestinal: +BS, soft, NTND  Extremities: No edema  Vascular: 2+ peripheral pulses        LABS: All Labs Reviewed:                        13.7   9.77  )-----------( 325      ( 09 Jun 2019 11:50 )             41.4     06-09    141  |  104  |  10  ----------------------------<  118<H>  4.4   |  32<H>  |  0.66    Ca    9.4      09 Jun 2019 11:50        < from: EEG Awake or Drowsy (06.10.19 @ 12:10) >    Impression:    1. Generalized bilateral slow activity noted.  2. Right more than left side frontotemporal slowing suggestive of   underlying structural pathology.  3. Intermittent generalized sharp wave activity suggestive of   epileptogenic activity suggestive of underlying epileptic focus in   bilateral temporal regions. Continued correlation recommended.      < end of copied text >    MEDICATIONS  (STANDING):  aspirin  chewable 81 milliGRAM(s) Oral daily  cyanocobalamin Injectable 1000 MICROGram(s) IntraMuscular daily  docusate sodium 100 milliGRAM(s) Oral three times a day  enoxaparin Injectable 40 milliGRAM(s) SubCutaneous daily  levETIRAcetam ER 1000 milliGRAM(s) Oral at bedtime  multivitamin/minerals 1 Tablet(s) Oral daily  sodium chloride 0.9%. 1000 milliLiter(s) (60 mL/Hr) IV Continuous <Continuous>    MEDICATIONS  (PRN):  ondansetron Injectable 4 milliGRAM(s) IV Push every 6 hours PRN Nausea  senna 2 Tablet(s) Oral at bedtime PRN Constipation      Assessment and Plan:   74y female w/     1. acute encephalopathy multifactorial  - B12 deficiency- start injections  - CT head negative, no fever/normal wbc/UA/CXR neg for infection, TSH normal  - MRI ordered to r/o new CVA/lesion  - EEG- as above , suggestive of epileptic focus, will discuss w/ Neuro  - continue keppra- level normal  - also in setting of underlying depression/meds--> cymbalta and wellbutrin stopped, new med remeron d/c and will continue abilify.    - Psych and Neuro f/u appreciated     2. seizure disorder  - as above    3. hx CVA  - as above  - continue aspirin, will discuss statin with     4.  failure to thrive/wt loss/severe protein calorie malnutriton  - possibly due to depression as  states patient chooses to not eat much for past 2 months  but  requesting more workup-  will image to r/o occult malignancy although low suspicion  - ivf today as per request;  if no improvement in intake,  wants to discuss nutrition options   - Nutrition eval/calorie count     5. dvt px   lovenox cc: weakness, wt loss, confusion  hpi: 74y female w/ pmh depression, seizure disorder, ICH w/ rt side residual weakness, chronic aphasia p/w weakness, ams, wt loss.    - chart reviewed.   was upset this morning b/c pt very lethargic- received remeron 7.5mg last night and as per  she's had adverse effect to remeron in past and cannot take.  States his wife was 93lbs 2 months ago and when they followed up outpt recently she was 80lbs b/c not eating at home.  She was advised to drink ensure supplements.   Seen by Neuro this morning and  would like to pursue further workup to r/o another CVA.      ros- limited due to mentation; as per hpi/ above    Vital Signs Last 24 Hrs  T(C): 37 (10 Andrés 2019 12:34), Max: 37 (10 Andrés 2019 12:34)  T(F): 98.6 (10 Andrés 2019 12:34), Max: 98.6 (10 Andrés 2019 12:34)  HR: 75 (10 Andrés 2019 12:34) (75 - 91)  BP: 104/59 (10 Andrés 2019 12:34) (104/59 - 158/71)  BP(mean): --  RR: 17 (10 Andrés 2019 12:34) (16 - 18)  SpO2: 96% (10 Andrés 2019 12:34) (94% - 96%)      PHYSICAL EXAM:  General: chronic ill appearing, cachectic, lethargic  Neuro: lethargic, rt side hemiplegia chronic  HEENT: NCAT  Respiratory: CTA b/l, no w/r/r  Cardiovascular: S1 and S2, RRR, no m/g/r  Gastrointestinal: +BS, soft, NTND  Extremities: No edema  Vascular: 2+ peripheral pulses        LABS: All Labs Reviewed:                        13.7   9.77  )-----------( 325      ( 09 Jun 2019 11:50 )             41.4     06-09    141  |  104  |  10  ----------------------------<  118<H>  4.4   |  32<H>  |  0.66    Ca    9.4      09 Jun 2019 11:50        < from: EEG Awake or Drowsy (06.10.19 @ 12:10) >    Impression:    1. Generalized bilateral slow activity noted.  2. Right more than left side frontotemporal slowing suggestive of   underlying structural pathology.  3. Intermittent generalized sharp wave activity suggestive of   epileptogenic activity suggestive of underlying epileptic focus in   bilateral temporal regions. Continued correlation recommended.      < end of copied text >    MEDICATIONS  (STANDING):  aspirin  chewable 81 milliGRAM(s) Oral daily  cyanocobalamin Injectable 1000 MICROGram(s) IntraMuscular daily  docusate sodium 100 milliGRAM(s) Oral three times a day  enoxaparin Injectable 40 milliGRAM(s) SubCutaneous daily  levETIRAcetam ER 1000 milliGRAM(s) Oral at bedtime  multivitamin/minerals 1 Tablet(s) Oral daily  sodium chloride 0.9%. 1000 milliLiter(s) (60 mL/Hr) IV Continuous <Continuous>    MEDICATIONS  (PRN):  ondansetron Injectable 4 milliGRAM(s) IV Push every 6 hours PRN Nausea  senna 2 Tablet(s) Oral at bedtime PRN Constipation      Assessment and Plan:   74y female w/     1. acute encephalopathy multifactorial  - B12 deficiency- start injections  - CT head negative, no fever/normal wbc/UA/CXR neg for infection, TSH normal  - MRI ordered to r/o new CVA/lesion  - EEG- as above , suggestive of epileptic focus, will discuss w/ Neuro  - continue keppra- level normal  - also in setting of underlying depression/meds--> cymbalta and wellbutrin stopped, new med remeron d/c and will continue abilify.    - Psych and Neuro f/u appreciated     2. seizure disorder  - as above    3. hx CVA  - as above  - continue aspirin, will discuss statin with     4.  failure to thrive/wt loss/severe protein calorie malnutriton  - possibly due to depression as  states patient chooses to not eat much for past 2 months  but  requesting more workup-  will image to r/o occult malignancy although low suspicion  - ivf today as per request;  if no improvement in intake,  wants to discuss nutrition options   - Nutrition eval/calorie count     5. dvt px   lovenox

## 2019-06-10 NOTE — PROGRESS NOTE BEHAVIORAL HEALTH - SUMMARY
72 yowmf, domiciled with , lives with 24/7 aid except on weekends when  is primary caregiver, PPH Depression, in treatment with Dr Alessandra Moses, since CVA 4 yrs ago, no in pt psych admissions or h/o self harm, no alcohol or drug abuse,   current psych meds Abilify2 mg, Cymbalta 90 mg/day, Lamictal 100 mg hs, and per Dr Mcwilliams she has also been prescribed Wellbutrin  mg qd, since April,  bib  due to poor po intake and irritability  Psych eval  depression.  Pt presents with agitation and AMS, unable to complete sentences, or verbalize history, repeating phrases and unable to be reassured with or without husbands presence.  Pt has underlying depressive disorder and has been treated with meds and message left with her psychiatrist to clarify doses and accuracy.  Pt symptoms are consistent with delirium,  which includes acute onset and fluctuation in awareness.  Would recommend holding all Cymbalta and Wellbutrin until delirium improves,  and underlying medical condition resolves, as can worsen confusion.  Recommend increasing Abilify to 5 mg qd,  and continue Lamictal 100 hs.  Avoid Benzo which can increase confusion.  6/8  Pt leg agitated and is observed eating a hanburger.  No acute distress, impoverished.  Continues to state she wants to go home.
72 yowmf, domiciled with , lives with 24/7 aid except on weekends when  is primary caregiver, PPH Depression, in treatment with Dr Alessandra Moses, since CVA 4 yrs ago, no in pt psych admissions or h/o self harm, no alcohol or drug abuse,   Prior to admission meds were Abilify2 mg, Cymbalta 90 mg/day, Lamictal 100 mg hs, and per Dr Mcwilliams she has also been prescribed Wellbutrin  mg qd, since April,  admitted for dehydration, loosing weight and poor oral intake.   Pt has underlying depressive disorder and has been treated with meds as above.   Pt admitted for AMS -  delirium,  which includes acute onset and fluctuation in awareness.  Cymbalta and Wellbutrin weer d/blayne until delirium improves,  and underlying medical condition resolves. Abilify was increased to 5 mg qd,  and continued Lamictal 100 hs.  Suggested to Avoid Benzo which can increase confusion.  Started ion Remeron to address depression , weight loss but it caused oversedation.     Plan  To address oversedation,   1. D/C remeron,   2. Change abilify to HS
72 yowmf, domiciled with , lives with 24/7 aid except on weekends when  is primary caregiver, PPH Depression, in treatment with Dr Alessandra Moses, since CVA 4 yrs ago, no in pt psych admissions or h/o self harm, no alcohol or drug abuse,   current psych meds Abilify2 mg, Cymbalta 90 mg/day, Lamictal 100 mg hs, and per Dr Mcwilliams she has also been prescribed Wellbutrin  mg qd, since April,  bib  due to poor po intake and irritability  Psych eval  depression.  Pt presents with agitation and AMS, unable to complete sentences, or verbalize history, repeating phrases and unable to be reassured with or without husbands presence.  Pt has underlying depressive disorder and has been treated with meds and message left with her psychiatrist to clarify doses and accuracy.  Pt symptoms are consistent with delirium,  which includes acute onset and fluctuation in awareness.  Would recommend holding all Cymbalta and Wellbutrin until delirium improves,  and underlying medical condition resolves, as can worsen confusion.  Recommend increasing Abilify to 5 mg qd,  and continue Lamictal 100 hs.  Avoid Benzo which can increase confusion.  6/8  Pt leg agitated and is observed eating a hanburger.  No acute distress, impoverished.  Continues to state she wants to go home.

## 2019-06-10 NOTE — PROGRESS NOTE BEHAVIORAL HEALTH - NSBHATTESTSEENBY_PSY_A_CORE
NP with telephonic supervision from Attending Psychiatrist
NP with telephonic supervision from Attending Psychiatrist
attending Psychiatrist without NP/Trainee

## 2019-06-10 NOTE — CHART NOTE - NSCHARTNOTEFT_GEN_A_CORE
CODE BLUE called ~22:05 on pt with cardiac arrest. Per RN, pt has appeared "lethargic" during night shift. Family was at bedside this evening when RN was notified that pt had stopped breathing. CPR was already in process by time of RRT team arrival to bedside. Pt was found pulseless. CPR was continued.   Per chart review, pt was admitted on 6/7 for weakness with weight loss, pt with h/o basal ganglia hemorrhage, CVA with R sided residual weakness, seizure. Initial CT head on 6/7 negative for acute hemorrhage. MRI head obtained on 6/10 with new extensive SAH,  ICH in posterior fossa causing brain compression/herniation, Neurosurgery was made aware, as per chart review. Per NSG note, was awaiting CTA head/neck to r/o aneurysm. Pt returned back to floor from CTA ~21:17.    Vital Signs Last 24 Hrs  T(C): 36.9 (10 Andrés 2019 20:12), Max: 37 (10 Andrés 2019 12:34)  T(F): 98.5 (10 Andrés 2019 20:12), Max: 98.6 (10 Andrés 2019 12:34)  HR: 96 (10 Andrés 2019 16:42) (75 - 96)  BP: 160/85 (10 Andrés 2019 20:12) (104/59 - 160/85)  RR: 18 (10 Andrés 2019 20:12) (16 - 18)  SpO2: 98% (10 Andrés 2019 20:12) (93% - 98%)    PE:  General: CPR ongoing, pt appears pale, no respiratory effort  Lungs: no spontaneous breathing initially  CV: pulseless with radial and femoral checks initially    ET tube placed, Pt received x1 dose epinephrine. ROSC achieved ~22:12, pt with +radial and femoral pulse. CPR was stopped at that time.    A/P:  Sudden cardiac arrest with ROSC achieved  - s/p x1 dose epi  - Pt intubated  - Pt transferred to ICU  - Intensivist Dr. Low at bedside during Code Blue - spoke with pt's family afterwards    Discussed with Dr. Low and Dr. Bethea (PGY-3)

## 2019-06-10 NOTE — CONSULT NOTE ADULT - ASSESSMENT
73 y/o F with PMHx of depression, seizure, HLD, basal ganglia hemorrhage, CVA with R sided residual weakness, ambulation with assistance presenting to the ED for recent weight loss and weakness.  at bedside states pt has not been eating. Admitted after Falling and AMS    Delirium R/o oxic/ metabolic Encephalopathy    B12 deficiency    With Old CVA R/o Seizures    REc EEG    MRI if no  improvement.    Adjust meds.    Agree with current management.    Discussed with Pt's  at bed side.    Will F/u.    PT/OT with PROM.

## 2019-06-10 NOTE — CONSULT NOTE ADULT - ATTENDING COMMENTS
I Reviewed this emergency case while in the OR then after surgery completed review.     This is a devastating hemorrhage that appears to originate in the brainstem with extension into the forth ventricle and ambient cistern in particular. The patient is obtunded with poor ability to protect airway. both pupils are dilated.     given the location of the hemorrhage and neurologic effects there is very little chance of meaningful neurologic recovery. There is no surgical intervention appropriate to reverse this condition.

## 2019-06-10 NOTE — CHART NOTE - NSCHARTNOTEFT_GEN_A_CORE
*calorie count noted very minimal intake throughout the day. Based on documentation pt consumed 62kcal and 0 grams of protein meeting 5% estimated kcal and 0% estimated protein needs.  *d/w pts  will add ensure enlive 1x/day. Discussed nutrient/calorically dense food options w/ family to supplement intake.       Assessment:   Estimated Energy Needs (calories/kg):  · Weight Used for Calculation  ideal     Estimated Energy Needs (25-30 calories/kg):  · Weight  (lbs)  100 lb  · Weight (kg)  45.4 kg  · From (25cal/kg)  1135   · To (30cal/kg)  1362     Other Calculation:  · Other Calculation  Ht: 26in; Wt: 36.6; BMI: 16.3; IBW: 45.4kg; IBW%: 80%    Estimated Protein Needs (1.4-1.6 g/kg):  · Weight  (lbs)  99.2   · Weight (kg)  45 kg  · From (1.4 g/kg)  63   · To (1.6 g/kg)  72     Estimated Fluid Needs (25-30 ml/kg):  · Weight  (lbs)  99.2   · Weight (kg)  45   · From (25 ml/kg)  1125   · To (30 ml/kg)  1350

## 2019-06-10 NOTE — CONSULT NOTE ADULT - ASSESSMENT
75 y/o F with PMHx of depression, seizure, HLD, basal ganglia hemorrhage, CVA with R sided residual weakness, ambulation with assistance presenting to the ED for recent weight loss and weakness. HCT from 6/7 negative for hemorrhage.  Neurosurgery called for new MRI Brain findings of new extensive SAH, ICH in posterior fossa causing brain compression/herniation. GCS 7 V:2 M:4 E:1    Plan:  - No acute neurosurgical intervention  - Will follow up CTA  - Stat CTA Head/Neck r/o aneurysm  - CT results reviewed with Daughter and Son at bedside  - HOB 30 degrees  - Discussed with intensivist for transfer close neurological monitoring  - Discussed with Hospitalist Dr. Ketty George reviewed HCT imaging and is in agreement with the plan 75 y/o F with PMHx of depression, seizure, HLD, basal ganglia hemorrhage, CVA with R sided residual weakness, ambulation with assistance presenting to the ED for recent weight loss and weakness. HCT from 6/7 negative for hemorrhage.  Neurosurgery called for new MRI Brain findings of new extensive SAH, ICH in posterior fossa causing brain compression/herniation. GCS 7 V:2 M:4 E:1    Plan:  - No acute neurosurgical intervention  - Will follow up CTA  - Stat CTA Head/Neck r/o aneurysm  - CT results reviewed with Daughter and Son at bedside  - HOB 30 degrees  - Discussed with intensivist for transfer close neurological monitoring  - Discussed with Hospitalist Dr. Ketty George reviewed HCT imaging and is in agreement with the plan    Critical care time >65 minutes in review of imaging, coordination of care with intensivist, implementing neuro protective measures to avoid neuro axis collapse / failure / cardiopulmonary arrest.

## 2019-06-10 NOTE — PROGRESS NOTE BEHAVIORAL HEALTH - NSBHFUPINTERVALHXFT_PSY_A_CORE
Pt seen in room for follow up eval.  Pt smiling and calmer than yesterday, sitting out of bed eating a hamburger.  Admits to feeling better, no agitation, or distress.  Oriented to date but stated "8/8" for month and date.  Urinalysis, specimen was not received and was reordered.  Remains delirium but less agitated.
Pt seen today for follow up.  Per staff and family has been sleeping all morning and is lethargic.  Pt c/o lethargy.  Staff not aware of if awake last night.  Wellbutrin and Cymbalta held due to delirium, and Abilify increased to 5 mg.  Unlikely is SE from Abilify as is not usually sedating and Pt had been on it at same dose in past.  Discussed starting Remeron for depression and to stimulate appetite, with family after discussed with Pt private psychiatrist yesterday.  Ate 1/4 bagel this am.
PT is in her bed, her  is at bedside. PT is sleeping; per RN more sleepy today, the same per . She was started on Remeron.  [id number] Abilify well.  is asking about medication before hospitalization. Explained that Wellbutrin and Cymbalta we held and d/c ed because of delirium. Also, in pt of her age they can be too stimulating and pt si loosing weight.  understands.   Pt opens her eyes and states she is "OK". When asked if she kristopher any complaints she nodes her head negatively.   There is no suicidal or homicidal behavior. No agitation. No psychosis elicited.

## 2019-06-10 NOTE — PHYSICAL THERAPY INITIAL EVALUATION ADULT - PERTINENT HX OF CURRENT PROBLEM, REHAB EVAL
Pt admitted to  seondary to AMS, decrease po intake, 10 lb weight loss over the past month, and inability to amb. X-ray pelvis: neg. CT head: neg for acute changes.

## 2019-06-11 VITALS — SYSTOLIC BLOOD PRESSURE: 83 MMHG | HEART RATE: 92 BPM | DIASTOLIC BLOOD PRESSURE: 35 MMHG | OXYGEN SATURATION: 99 %

## 2019-06-11 LAB
ALBUMIN SERPL ELPH-MCNC: 2.5 G/DL — LOW (ref 3.3–5)
ALP SERPL-CCNC: 70 U/L — SIGNIFICANT CHANGE UP (ref 40–120)
ALT FLD-CCNC: 143 U/L — HIGH (ref 12–78)
ANION GAP SERPL CALC-SCNC: 7 MMOL/L — SIGNIFICANT CHANGE UP (ref 5–17)
APPEARANCE UR: CLEAR — SIGNIFICANT CHANGE UP
AST SERPL-CCNC: 153 U/L — HIGH (ref 15–37)
BACTERIA # UR AUTO: ABNORMAL
BASE EXCESS BLDA CALC-SCNC: -11.3 MMOL/L — LOW (ref -2–2)
BASE EXCESS BLDV CALC-SCNC: -6.1 MMOL/L — LOW (ref -2–2)
BILIRUB SERPL-MCNC: 0.4 MG/DL — SIGNIFICANT CHANGE UP (ref 0.2–1.2)
BILIRUB UR-MCNC: NEGATIVE — SIGNIFICANT CHANGE UP
BUN SERPL-MCNC: 25 MG/DL — HIGH (ref 7–23)
CALCIUM SERPL-MCNC: 7.6 MG/DL — LOW (ref 8.5–10.1)
CHLORIDE SERPL-SCNC: 114 MMOL/L — HIGH (ref 96–108)
CO2 SERPL-SCNC: 25 MMOL/L — SIGNIFICANT CHANGE UP (ref 22–31)
COLOR SPEC: YELLOW — SIGNIFICANT CHANGE UP
COMMENT - URINE: SIGNIFICANT CHANGE UP
CREAT SERPL-MCNC: 0.77 MG/DL — SIGNIFICANT CHANGE UP (ref 0.5–1.3)
DIFF PNL FLD: ABNORMAL
EPI CELLS # UR: SIGNIFICANT CHANGE UP
GAS PNL BLDA: SIGNIFICANT CHANGE UP
GLUCOSE SERPL-MCNC: 135 MG/DL — HIGH (ref 70–99)
GLUCOSE UR QL: 50 MG/DL
HCO3 BLDA-SCNC: 21 MMOL/L — SIGNIFICANT CHANGE UP (ref 21–29)
HCO3 BLDV-SCNC: 24 MMOL/L — SIGNIFICANT CHANGE UP (ref 21–29)
HCT VFR BLD CALC: 36.4 % — SIGNIFICANT CHANGE UP (ref 34.5–45)
HGB BLD-MCNC: 11.6 G/DL — SIGNIFICANT CHANGE UP (ref 11.5–15.5)
KETONES UR-MCNC: ABNORMAL
LACTATE SERPL-SCNC: 1.7 MMOL/L — SIGNIFICANT CHANGE UP (ref 0.7–2)
LEUKOCYTE ESTERASE UR-ACNC: ABNORMAL
MCHC RBC-ENTMCNC: 31.6 PG — SIGNIFICANT CHANGE UP (ref 27–34)
MCHC RBC-ENTMCNC: 31.9 GM/DL — LOW (ref 32–36)
MCV RBC AUTO: 99.2 FL — SIGNIFICANT CHANGE UP (ref 80–100)
NITRITE UR-MCNC: NEGATIVE — SIGNIFICANT CHANGE UP
PCO2 BLDA: 79 MMHG — CRITICAL HIGH (ref 32–46)
PCO2 BLDV: 70 MMHG — HIGH (ref 35–50)
PH BLDA: 7.05 — CRITICAL LOW (ref 7.35–7.45)
PH BLDV: 7.15 — CRITICAL LOW (ref 7.35–7.45)
PH UR: 7 — SIGNIFICANT CHANGE UP (ref 5–8)
PLATELET # BLD AUTO: 409 K/UL — HIGH (ref 150–400)
PO2 BLDA: 65 MMHG — LOW (ref 74–108)
PO2 BLDV: 236 MMHG — HIGH (ref 25–45)
POTASSIUM SERPL-MCNC: 3.8 MMOL/L — SIGNIFICANT CHANGE UP (ref 3.5–5.3)
POTASSIUM SERPL-SCNC: 3.8 MMOL/L — SIGNIFICANT CHANGE UP (ref 3.5–5.3)
PROT SERPL-MCNC: 4.8 GM/DL — LOW (ref 6–8.3)
PROT UR-MCNC: 100 MG/DL
RBC # BLD: 3.67 M/UL — LOW (ref 3.8–5.2)
RBC # FLD: 12.1 % — SIGNIFICANT CHANGE UP (ref 10.3–14.5)
RBC CASTS # UR COMP ASSIST: ABNORMAL /HPF (ref 0–4)
SAO2 % BLDA: 84 % — LOW (ref 92–96)
SAO2 % BLDV: 98 % — HIGH (ref 67–88)
SODIUM SERPL-SCNC: 146 MMOL/L — HIGH (ref 135–145)
SP GR SPEC: 1.01 — SIGNIFICANT CHANGE UP (ref 1.01–1.02)
UROBILINOGEN FLD QL: NEGATIVE MG/DL — SIGNIFICANT CHANGE UP
WBC # BLD: 13.2 K/UL — HIGH (ref 3.8–10.5)
WBC # FLD AUTO: 13.2 K/UL — HIGH (ref 3.8–10.5)
WBC UR QL: ABNORMAL

## 2019-06-11 PROCEDURE — 99232 SBSQ HOSP IP/OBS MODERATE 35: CPT

## 2019-06-11 PROCEDURE — 71045 X-RAY EXAM CHEST 1 VIEW: CPT | Mod: 26

## 2019-06-11 RX ORDER — CHLORHEXIDINE GLUCONATE 213 G/1000ML
15 SOLUTION TOPICAL
Refills: 0 | Status: DISCONTINUED | OUTPATIENT
Start: 2019-06-11 | End: 2019-06-11

## 2019-06-11 RX ORDER — SODIUM CHLORIDE 9 MG/ML
1000 INJECTION, SOLUTION INTRAVENOUS
Refills: 0 | Status: DISCONTINUED | OUTPATIENT
Start: 2019-06-11 | End: 2019-06-11

## 2019-06-11 RX ORDER — INSULIN LISPRO 100/ML
6 VIAL (ML) SUBCUTANEOUS ONCE
Refills: 0 | Status: DISCONTINUED | OUTPATIENT
Start: 2019-06-11 | End: 2019-06-11

## 2019-06-11 RX ORDER — SODIUM CHLORIDE 9 MG/ML
1000 INJECTION INTRAMUSCULAR; INTRAVENOUS; SUBCUTANEOUS ONCE
Refills: 0 | Status: COMPLETED | OUTPATIENT
Start: 2019-06-11 | End: 2019-06-11

## 2019-06-11 RX ORDER — NOREPINEPHRINE BITARTRATE/D5W 8 MG/250ML
0.05 PLASTIC BAG, INJECTION (ML) INTRAVENOUS
Qty: 8 | Refills: 0 | Status: DISCONTINUED | OUTPATIENT
Start: 2019-06-11 | End: 2019-06-11

## 2019-06-11 RX ADMIN — CHLORHEXIDINE GLUCONATE 15 MILLILITER(S): 213 SOLUTION TOPICAL at 06:07

## 2019-06-11 RX ADMIN — Medication 3.43 MICROGRAM(S)/KG/MIN: at 11:48

## 2019-06-11 RX ADMIN — SODIUM CHLORIDE 125 MILLILITER(S): 9 INJECTION, SOLUTION INTRAVENOUS at 09:47

## 2019-06-11 RX ADMIN — SODIUM CHLORIDE 60 MILLILITER(S): 9 INJECTION INTRAMUSCULAR; INTRAVENOUS; SUBCUTANEOUS at 00:34

## 2019-06-11 RX ADMIN — Medication 3.43 MICROGRAM(S)/KG/MIN: at 03:55

## 2019-06-11 RX ADMIN — SODIUM CHLORIDE 100 MILLILITER(S): 9 INJECTION INTRAMUSCULAR; INTRAVENOUS; SUBCUTANEOUS at 01:13

## 2019-06-11 RX ADMIN — SODIUM CHLORIDE 1000 MILLILITER(S): 9 INJECTION INTRAMUSCULAR; INTRAVENOUS; SUBCUTANEOUS at 01:12

## 2019-06-11 RX ADMIN — LEVETIRACETAM 400 MILLIGRAM(S): 250 TABLET, FILM COATED ORAL at 06:07

## 2019-06-11 RX ADMIN — LEVETIRACETAM 400 MILLIGRAM(S): 250 TABLET, FILM COATED ORAL at 00:08

## 2019-06-11 NOTE — PROGRESS NOTE ADULT - PROVIDER SPECIALTY LIST ADULT
Critical Care
Critical Care
Hospitalist
Neurosurgery
Neurosurgery
Psychiatry
Critical Care

## 2019-06-11 NOTE — PROGRESS NOTE ADULT - SUBJECTIVE AND OBJECTIVE BOX
Critical care       HPI:  75 y/o F with PMHx of depression, seizure, HLD, basal ganglia hemorrhage, CVA with R sided residual weakness, ambulation with assistance presenting to the ED for recent weight loss and weaknes Pt has reportedly had a ~10lb weight loss over the course of 1 month. CT head was negative. on admission  Last night became obtunded found to have massive brain stem bleed than had cardiac arrest.  Patient now intubated, on pressors. minimal neurologic response    ROS - cant obtain      PAST MEDICAL & SURGICAL HISTORY:  Depression  Seizure  CVA (cerebral infarction)  Basal ganglia hemorrhage  Hyperlipemia  High cholesterol    FAMILY HISTORY:  No pertinent family history in first degree relatives    MEDICATIONS  (STANDING):  chlorhexidine 0.12% Liquid 15 milliLiter(s) Oral Mucosa two times a day  lactated ringers. 1000 milliLiter(s) (125 mL/Hr) IV Continuous <Continuous>  levETIRAcetam  IVPB 500 milliGRAM(s) IV Intermittent every 12 hours  norepinephrine Infusion 0.05 MICROgram(s)/kG/Min (3.431 mL/Hr) IV Continuous <Continuous>  phenylephrine    Infusion 0.1 MICROgram(s)/kG/Min (1.373 mL/Hr) IV Continuous <Continuous>      Vital Signs Last 24 Hrs  T(C): 36.9 (10 Andrés 2019 20:12), Max: 37 (10 Andrés 2019 12:34)  T(F): 98.5 (10 Andrés 2019 20:12), Max: 98.6 (10 Andrés 2019 12:34)  HR: 76 (11 Jun 2019 08:49) (62 - 116)  BP: 109/41 (11 Jun 2019 06:35) (56/32 - 161/96)  BP(mean): 63 (11 Jun 2019 06:35) (30 - 114)  RR: 13 (11 Jun 2019 06:35) (10 - 19)  SpO2: 100% (11 Jun 2019 08:49) (93% - 100%)  Daily     Daily     PHYSICAL EXAM:    General:  Comatose  Neuro: Pupils fixed and dilated, no corneals, no gag, no response to noxious stimuli  HEENT: orally intubated  Cardiovascular: RRR   Lungs: clear  Abdomen: soft , non tender   .Extremities:  2+ pulses in upper and lower extremities. No lower extremity pain or  edema;LABS:                        11.6   13.20 )-----------( 409      ( 11 Jun 2019 03:39 )             36.4     06-11    146<H>  |  114<H>  |  25<H>  ----------------------------<  135<H>  3.8   |  25  |  0.77    Ca    7.6<L>      11 Jun 2019 03:39    TPro  4.8<L>  /  Alb  2.5<L>  /  TBili  0.4  /  DBili  x   /  AST  153<H>  /  ALT  143<H>  /  AlkPhos  70  06-11    Lactate, Blood: 1.7 mmol/L (06-11 @ 03:39)

## 2019-06-11 NOTE — PROGRESS NOTE ADULT - SUBJECTIVE AND OBJECTIVE BOX
Patient clinically with   Pupils fixed and dilated  no corneas,  no gag  no occulocephalic reflex  no response to cold calorics    prior to performing apnea test patient became incresingly hypotensive  family opted to withdraw patient from ventiltor  was pronounced at 315 pm

## 2019-06-11 NOTE — DISCHARGE NOTE FOR THE EXPIRED PATIENT - HOSPITAL COURSE
Patient admitted with lethargty and weakness.  initial head ct was negative  patient had subsequent decline had f/u head ct which showed massive head bleed  no neurologic intervention indicated given massive brain stem bleed  while performing brain death criteria, pateint became increasing hypotension  family decided to withdraw , patient

## 2019-06-11 NOTE — PROGRESS NOTE ADULT - SUBJECTIVE AND OBJECTIVE BOX
Procedure: Central Line  Physician: Devante   Indication:Shock  Anesthesia: None    A time-out was completed, verifying correct patient, procedure, site, positioning, and implant(s) or special equipment if applicable. Patient’s  right IJ area was prepped and draped in usual sterile fashion.  A Triple lumen central line was introduced over a wire via the Seldinger technique, then sutured in place. Good blood flow was noted from all ports. The patient tolerated the procedure well. Chest x-ray was ordered to assess for pneumothorax and catheter placement.  Complications: None  Blood loss: Minimal

## 2019-06-11 NOTE — PROGRESS NOTE ADULT - ATTENDING COMMENTS
I performed examination this am -     bilateral fixed and dilated pupils. no light reflex. No corneal reflex. Doll's eye response negative. No cough or gag response to deep suctioning or manipulation of endotracheal tube. With ventilator temporarily disconnected the patient was observed to make NO respiratory efforts.     there are no peripheral motor responses in the upper or lower limbs to peripheral  or central painful stimulus.     As reviewed with Dr. Peterson I recommend completion of formal brain death criteria form but expect both cold calorimics and blood gas testing will confirm neurologic status
I have discussed the case with the PA remotely    I am aware of the clinical deterioration of the patient while she was still on the unit. Code called and patient resucitated and intubated. Patient then transferred to ICU.     I have reiterated that this is a devastating neurologic injury with no chance of meaningful independent recovery. There is no surgical intervention that could reverse or improve her neurologic injury. I recommend transition to comfort measures.

## 2019-06-11 NOTE — PROGRESS NOTE ADULT - ASSESSMENT
75 y/o F with PMHx of depression, seizure, HLD, basal ganglia hemorrhage, CVA with R sided residual weakness. HCT from 6/7 negative for hemorrhage - seen by neurology 6/10 and MRI recommended.  Neurosurgery called for new MRI Brain findings of extensive SAH, Suboccipital ICH in midbrain location, confirmed by immediate CTH.  Intracerebral hemorrhage causing extreme central compression/herniation with hydrocephalus.     Plan:  - Patient grave prognosis discussed with patient  at bedside by Dr. George and Dr. Peterson  - Grave neurological status and imaging findings not c/w meaningful recovery, no brainstem function noted on exam  - No acute neurosurgical intervention due to large devastating ICH with brain compression and herniation  - GOC to be discussed with family at bedside  - Continue care as per ICU    D/w Dr. George

## 2019-06-11 NOTE — CHART NOTE - NSCHARTNOTEFT_GEN_A_CORE
Clinical Nutrition BRIEF NOTE    *pt s/p obtunded, found to have massive brain stem bleed than had cardiac arrest.  Pt is now intubated, on pressors with minimal neurologic response.  s/p transfer to ICU.  *pt was on calorie count which showed minimal intake.  given pt's current medical status and intubation, calorie count is no longer appropriate.  rec'd d/c at this time.  *at this time nutrition intervention not warranted.  if pt becomes hemodynamically stable, re-consult for f/u nutrition recommendations.        RECOMMENDATION:  if pt becomes hemodynamically stable, re-consult for f/u nutrition recommendations.

## 2019-06-11 NOTE — CONSULT NOTE ADULT - ASSESSMENT
73 y/o F with PMH of depression, seizure, HLD, basal ganglia hemorrhage, CVA with R sided residual weakness, who presented the ED on 6/7 for recent weight loss, weakness and failure to thrive found to have catastrophic extensive subarachnoid and intraventricular hemorrhage, s/p code blue with ROSC  #Neuro: Neurosurgery following, discussed with family re poor prognosis  -No acute surgical intervention  -Utox  -Keppra  #Pulm: Intubated, continue vent support  #Cardio: Hypotensive from shock s/p code, continue phenylephrine  -NS bolus  -Check lactic  -May need second pressor  #GI: No acute issues  #Renal: No acute issues  -Check CMP  #DVT ppx: Lovenox  #FEN: NPO  #Code: Full for now, continued family discussions regarding goals of care  #Dispo: Inpatient ICU for stroke and cardiac arrest

## 2019-06-11 NOTE — CONSULT NOTE ADULT - SUBJECTIVE AND OBJECTIVE BOX
HPI: SHELTON SILVER is a 75 y/o F with PMH of depression, seizure, HLD, basal ganglia hemorrhage, CVA with R sided residual weakness, who presented the ED on 6/7 for recent weight loss, weakness and failure to thrive. She had a ~10lb weight loss over the course of 1 month. On the day of transfer, the patient was very lethargic, attributed to adverse effect of remeron. She was seen by neuro who recommended repeat imaging for brain bleed. HCT revealed intraventricular hemorrhage. She then had a follow up CT angio. Shortly after arrival from CT scan, patient had a Code Blue. She was given ep x 1 with ROSC. She was intubated and transferred to ICU, where she remained hypotensive on pressors. Family at bedside.    Past Medical History:   Depression  Seizure  CVA (cerebral infarction)  Basal ganglia hemorrhage  Hyperlipemia  High cholesterol    Past Surgical History: None    Family History:    No pertinent family history in first degree relatives     Social History: Nonsmoker    Review of Systems:  All 10 systems reviewed in detailed and found to be negative with the exception of what has already been described above    Allergies:  No Known Allergies    Meds  MEDICATIONS  (STANDING):  ARIPiprazole 5 milliGRAM(s) Oral at bedtime  cyanocobalamin Injectable 1000 MICROGram(s) IntraMuscular daily  docusate sodium 100 milliGRAM(s) Oral three times a day  levETIRAcetam  IVPB 500 milliGRAM(s) IV Intermittent every 12 hours  levETIRAcetam ER 1000 milliGRAM(s) Oral at bedtime  multivitamin/minerals 1 Tablet(s) Oral daily  niCARdipine Infusion 5 mG/Hr (25 mL/Hr) IV Continuous <Continuous>  phenylephrine    Infusion 0.1 MICROgram(s)/kG/Min (1.373 mL/Hr) IV Continuous <Continuous>  sodium chloride 0.9% Bolus 1000 milliLiter(s) IV Bolus once  sodium chloride 0.9%. 1000 milliLiter(s) (100 mL/Hr) IV Continuous <Continuous>    MEDICATIONS  (PRN):  ondansetron Injectable 4 milliGRAM(s) IV Push every 6 hours PRN Nausea  senna 2 Tablet(s) Oral at bedtime PRN Constipation    Physical Exam  T(C): 36.9 (06-10-19 @ 20:12), Max: 37 (06-10-19 @ 12:34)  HR: 70 (06-11-19 @ 00:30) (70 - 116)  BP: 80/37 (06-11-19 @ 00:30) (77/37 - 161/96)  RR: 12 (06-11-19 @ 00:30) (10 - 19)  SpO2: 100% (06-11-19 @ 00:30) (93% - 100%)  Gen: Intubated, sedated  HEENT: R pupil fixed and dilated  Cardio: Regular rhythm and rate, normal S1S2, no murmurs  Resp: Clear to auscultation bilaterally, no wheezing or rhonchi  GI: Nontender, nondistended, normoactive bowel sounds  Ext: No cyanosis, clubbing or edema  Neuro: Unresponsive    Labs:                        13.7   9.77  )-----------( 325      ( 09 Jun 2019 11:50 )             41.4     06-09    141  |  104  |  10  ----------------------------<  118<H>  4.4   |  32<H>  |  0.66    Ca    9.4      09 Jun 2019 11:50    < from: Xray Chest 1 View- PORTABLE-Urgent (06.10.19 @ 01:35) >  EXAM:  XR CHEST PORTABLE URGENT 1V                            PROCEDURE DATE:  06/10/2019          INTERPRETATION:  History: Aspiration    Chest:  one view.      Comparison: 06/07/2019    AP radiograph of the chest demonstrates no evidence of infiltrate,   pleural effusion or vascular congestion. No atelectasis is seen. The   cardiac silhouette is normal in size. Osseous structures are intact.    Impression: No active pulmonary disease.    < end of copied text >    < from: CT Head No Cont (06.10.19 @ 19:10) >  PROCEDURE DATE:  06/10/2019          INTERPRETATION:  CLINICAL STATEMENT: CVA with worsening altered mental   status    TECHNIQUE: CT of the head was performed without IV contrast.    COMPARISON: 6/7/2019.    FINDINGS: There is a new acute large focus of subarachnoid and   intraventricular hemorrhage. This is centered in the basal cisterns with   blood filling the fourth ventricle and surrounding the brainstem. There   is also parenchymal involvement of the left brachium pontis There is mass   effect by the hematoma on the left aspect of the midbrain there is a   small amount of subarachnoid blood in the occipital lobes bilaterally.    There is diffuse parenchymal volume loss. There are areas of low   attenuation in the periventricular white matter likely related to chronic   microvascular ischemic changes.    There is no parenchymal mass. There is no acute territorial infarct.     The ventricles are mildly prominent likely secondary to centralatrophy.   There is intracranial atherosclerosis.    The calvarium is intact. The visualized paranasal sinuses are well   aerated. The visualized orbits are unremarkable.    IMPRESSION:    Acute extensive hemorrhage which is subarachnoid and intraventricular   involving predominantly the posterior fossa and to lesser extent the   occipital lobes. The hematoma causes significant mass effect on the left   aspect of the midbrain. There is also component of intraparenchymal   hemorrhage within the left brachium pontis. Findings are also seen on the   MR brain done at the same time.    < end of copied text >       CT head neck with contrast pending official read

## 2019-06-11 NOTE — PROGRESS NOTE ADULT - SUBJECTIVE AND OBJECTIVE BOX
HPI:  73 y/o F with PMHx of depression, seizure, HLD, basal ganglia hemorrhage, CVA with R sided residual weakness. HCT from 6/7 negative for hemorrhage - seen by neurology 6/10 and MRI recommended.  Neurosurgery called for new MRI Brain findings of extensive SAH, Suboccipital ICH in midbrain location, confirmed by immediate CTH.  Intracerebral hemorrhage causing extreme central compression/herniation with hydrocephalus.  Recommendation for immediate Crit Care management / ICU transfer along with CTA for devastating intracranial bleed.  Grave prognosis for patient communicated with family.  Code event - pt managed in ICU.  CTA demonstrates progression of hemorrhage with IVH extension (not officially read).  Pt with devastating / grave neurological status and CT findings regarding midbrain hemorrhagic stroke.  D/w family about no meaningful recovery with such a devastating ICH.  Family HCP () still requesting full supportive measures.  Daughter understands gravity of situation at this time. GCS: E1VTM2 (flexion w/d LE's)    6/11- Patient seen and examined with Dr. George, Dr. Peterson in ICU. Patient with large suboccipital ICH with brain compression and herniation. Patient no longer with brainstem function. Continues on pressors, intubated not on sedation. GCS 3T E:1 M:1 V:1    Vital Signs Last 24 Hrs  T(C): 36.1 (11 Jun 2019 10:00), Max: 37 (10 Andrés 2019 12:34)  T(F): 97 (11 Jun 2019 10:00), Max: 98.6 (10 Andrés 2019 12:34)  HR: 118 (11 Jun 2019 11:44) (62 - 118)  BP: 97/70 (11 Jun 2019 10:00) (56/32 - 161/96)  BP(mean): 78 (11 Jun 2019 10:00) (30 - 114)  RR: 12 (11 Jun 2019 10:00) (10 - 19)  SpO2: 100% (11 Jun 2019 11:44) (93% - 100%)    MEDICATIONS  (STANDING):  chlorhexidine 0.12% Liquid 15 milliLiter(s) Oral Mucosa two times a day  lactated ringers. 1000 milliLiter(s) (125 mL/Hr) IV Continuous <Continuous>  levETIRAcetam  IVPB 500 milliGRAM(s) IV Intermittent every 12 hours  norepinephrine Infusion 0.05 MICROgram(s)/kG/Min (3.431 mL/Hr) IV Continuous <Continuous>  phenylephrine    Infusion 0.1 MICROgram(s)/kG/Min (1.373 mL/Hr) IV Continuous <Continuous>    MEDICATIONS  (PRN):      PHYSICAL EXAM:  Constitutional: intubated, no response to verbal or noxious stimuli  HEENT: b/l pupils fixed and dilated  Neck: Supple  Respiratory: Breath sounds are clear bilaterally  Cardiovascular: S1 and S2, regular rhythm  Gastrointestinal: soft, nontender  Extremities:  no edema  Vascular: Caritid Bruit - no  Musculoskeletal: no abnormal movements  Skin: No rashes    Neurological exam:  HF: Intubated, not on sedation. No response to noxious stimuli  CN: b/l pupils fixed and dilated  Motor/Sens: no response to noxious stimuli x 4 extremities  Reflexes:  neg dolls, no gag, no corneals  Coord:  unable to assess  Gait/Balance:Cannot test        LABS:                         11.6   13.20 )-----------( 409      ( 11 Jun 2019 03:39 )             36.4     06-11    146<H>  |  114<H>  |  25<H>  ----------------------------<  135<H>  3.8   |  25  |  0.77    Ca    7.6<L>      11 Jun 2019 03:39    TPro  4.8<L>  /  Alb  2.5<L>  /  TBili  0.4  /  DBili  x   /  AST  153<H>  /  ALT  143<H>  /  AlkPhos  70  06-11    LIVER FUNCTIONS - ( 11 Jun 2019 03:39 )  Alb: 2.5 g/dL / Pro: 4.8 gm/dL / ALK PHOS: 70 U/L / ALT: 143 U/L / AST: 153 U/L / GGT: x                 RADIOLOGY:  CT Head No Cont (06.10.19 @ 19:10)  IMPRESSION:    Acute extensive hemorrhage which is subarachnoid and intraventricular   involving predominantly the posterior fossa and to lesser extent the   occipital lobes. The hematoma causes significant mass effect on the left   aspect of the midbrain. There is also component of intraparenchymal   hemorrhage within the left brachium pontis. Findings are also seen on the   MR brain done at the same time.

## 2019-06-11 NOTE — PROGRESS NOTE ADULT - ASSESSMENT
Patient with catastrophic head bleed,  s/p cardiac arrest  Patient with no brain stem function, will need formal  testing, bear hugger patient is hypothermic  continue pressor support  hypernatremia - replace  place park for I and Os  conitnue pressor support

## 2019-06-11 NOTE — PROGRESS NOTE ADULT - SUBJECTIVE AND OBJECTIVE BOX
reportedly pt has  intracranial  bleed and can not be interviewed     Psychiatry can resume f/u once pt improves.

## 2019-06-14 NOTE — ED ADULT NURSE NOTE - NS PRO PASSIVE SMOKE EXP
I discussed with Sharee over the phone about her anxiety and depression.    She follows up with psychiatrist and counselor at [facility]    FMLA forms done  
No

## 2019-06-25 DIAGNOSIS — I10 ESSENTIAL (PRIMARY) HYPERTENSION: ICD-10-CM

## 2019-06-25 DIAGNOSIS — G93.5 COMPRESSION OF BRAIN: ICD-10-CM

## 2019-06-25 DIAGNOSIS — J96.02 ACUTE RESPIRATORY FAILURE WITH HYPERCAPNIA: ICD-10-CM

## 2019-06-25 DIAGNOSIS — E78.5 HYPERLIPIDEMIA, UNSPECIFIED: ICD-10-CM

## 2019-06-25 DIAGNOSIS — G81.90 HEMIPLEGIA, UNSPECIFIED AFFECTING UNSPECIFIED SIDE: ICD-10-CM

## 2019-06-25 DIAGNOSIS — R62.7 ADULT FAILURE TO THRIVE: ICD-10-CM

## 2019-06-25 DIAGNOSIS — I46.9 CARDIAC ARREST, CAUSE UNSPECIFIED: ICD-10-CM

## 2019-06-25 DIAGNOSIS — I60.9 NONTRAUMATIC SUBARACHNOID HEMORRHAGE, UNSPECIFIED: ICD-10-CM

## 2019-06-25 DIAGNOSIS — I62.9 NONTRAUMATIC INTRACRANIAL HEMORRHAGE, UNSPECIFIED: ICD-10-CM

## 2019-06-25 DIAGNOSIS — I95.9 HYPOTENSION, UNSPECIFIED: ICD-10-CM

## 2019-06-25 DIAGNOSIS — R57.9 SHOCK, UNSPECIFIED: ICD-10-CM

## 2019-06-25 DIAGNOSIS — F32.9 MAJOR DEPRESSIVE DISORDER, SINGLE EPISODE, UNSPECIFIED: ICD-10-CM

## 2019-06-25 DIAGNOSIS — F05 DELIRIUM DUE TO KNOWN PHYSIOLOGICAL CONDITION: ICD-10-CM

## 2019-06-25 DIAGNOSIS — E87.0 HYPEROSMOLALITY AND HYPERNATREMIA: ICD-10-CM

## 2019-06-25 DIAGNOSIS — J96.01 ACUTE RESPIRATORY FAILURE WITH HYPOXIA: ICD-10-CM

## 2019-06-25 DIAGNOSIS — E43 UNSPECIFIED SEVERE PROTEIN-CALORIE MALNUTRITION: ICD-10-CM

## 2019-07-18 NOTE — ED PROVIDER NOTE - SKIN, MLM
normal... Well appearing, well nourished, awake, alert, oriented to person, place, time/situation and in no apparent distress. Patient up and eating Skin normal color for race, warm, dry and intact. No evidence of rash.

## 2020-01-03 NOTE — PROCEDURAL SAFETY CHECKLIST WITH OR WITHOUT SEDATION - NSBEDADDLTIME7_GEN_ALL_CORE
[facility] called stating they do not need another order for the Nebulizer; what they need is the Certificate of Medical Necessity signed and returned.  She has faxed another Certificate.     not applicable

## 2020-02-05 NOTE — ED BEHAVIORAL HEALTH ASSESSMENT NOTE - ACCOMPANIED BY
Chart note
Event Note
Event Note/RD Follow-Up
PA follow up note
post op
Self

## 2020-09-23 NOTE — PROGRESS NOTE BEHAVIORAL HEALTH - NSBHCONSULTFOLLOWAFTERCARE_PSY_A_CORE FT
Patient Education    Procedure: Left leg angiogram with possible intervention  Diagnosis: Non-healing left ankle wound  Anticoagulation Instruction: n/a, start 81mg ASA  Pre-Operative Physical Exam: You need to have a pre-op physical exam within 30 days of your procedure. Your procedure may be cancelled if you do not have a current History and Physical. Call your PCP's office to schedule.  Allergies:  Updated in Epic  Bowel Prep: n/a  NPO per protocol.   Post Procedure Education: [facility] patient post-procedure fact sheet reviewed with patient.    COVID-19 instructions for isolation and pre testing reviewed with pt.    Learner(s):patient  Method: Listening, Reading  Barriers to Learning:No Barrier  Outcome: Patient did verbalize understanding of above education.    Monica Ochoa, BSN, RN-[facility]            
follow up with private psychiatrist, who returned call and clarified meds.  Lamictal and Keppra were ordered by neurologist and Wellbutrin was lowered and just started 6 mo ago.  Cymbalta was also decreased by her to 60 mg.  She was considering Remeron for depression to assist in sleep and appetite.
follow up with private psychiatrist, who returned call and clarified meds.  Lamictal and Keppra were ordered by neurologist and Wellbutrin was lowered and just started 6 mo ago.  Cymbalta was also decreased by her to 60 mg.  She was considering Remeron for depression to assist in sleep and appetite.  Will consider once delirium resolves and cause is treated.
follow up with private psychiatrist Dr Alessandra Moses

## 2021-03-05 NOTE — ED ADULT TRIAGE NOTE - NS ED NURSE BANDS TYPE
Name band; I have reviewed and confirmed nurses' notes for patient's medications, allergies, medical history, and surgical history.

## 2022-01-21 NOTE — PHYSICAL THERAPY INITIAL EVALUATION ADULT - AMBULATION SKILLS, REHAB EVAL
Status[de-identified] INPATIENT [101]   Type of Bed: Intensive Care [6]   Inpatient Hospitalization Certified Necessary for the Following Reasons: 9.  Other (further clarification in H&P documentation)   Admitting Diagnosis: Respiratory failure with hypoxia [facility]) [[id number]]   Admitting Physician: Jenna Lucia [[id number]]   Attending Physician: Jenna Lucia [[id number]]   Estimated Length of Stay: 2 Midnights   Discharge Plan[de-identified] Home with Office Follow-up
needed assist/needs device
